# Patient Record
Sex: MALE | Race: WHITE | Employment: OTHER | URBAN - METROPOLITAN AREA
[De-identification: names, ages, dates, MRNs, and addresses within clinical notes are randomized per-mention and may not be internally consistent; named-entity substitution may affect disease eponyms.]

---

## 2018-01-22 ENCOUNTER — HOSPITAL ENCOUNTER (INPATIENT)
Age: 83
LOS: 14 days | Discharge: ANOTHER HOSPITAL | DRG: 193 | End: 2018-02-05
Attending: EMERGENCY MEDICINE | Admitting: FAMILY MEDICINE
Payer: MEDICARE

## 2018-01-22 ENCOUNTER — APPOINTMENT (OUTPATIENT)
Dept: GENERAL RADIOLOGY | Age: 83
DRG: 193 | End: 2018-01-22
Payer: MEDICARE

## 2018-01-22 DIAGNOSIS — J96.01 ACUTE HYPOXEMIC RESPIRATORY FAILURE (HCC): ICD-10-CM

## 2018-01-22 DIAGNOSIS — J11.1 INFLUENZA: Primary | ICD-10-CM

## 2018-01-22 DIAGNOSIS — E86.0 DEHYDRATION: ICD-10-CM

## 2018-01-22 DIAGNOSIS — J18.9 PNEUMONIA OF BOTH LUNGS DUE TO INFECTIOUS ORGANISM, UNSPECIFIED PART OF LUNG: ICD-10-CM

## 2018-01-22 PROBLEM — N18.9 ACUTE ON CHRONIC RENAL INSUFFICIENCY: Status: ACTIVE | Noted: 2018-01-22

## 2018-01-22 PROBLEM — N28.9 ACUTE ON CHRONIC RENAL INSUFFICIENCY: Status: ACTIVE | Noted: 2018-01-22

## 2018-01-22 PROBLEM — G20 PARKINSON DISEASE (HCC): Status: ACTIVE | Noted: 2018-01-22

## 2018-01-22 PROBLEM — J10.1 INFLUENZA A: Status: ACTIVE | Noted: 2018-01-22

## 2018-01-22 LAB
ABSOLUTE EOS #: 0 K/UL (ref 0–0.4)
ABSOLUTE IMMATURE GRANULOCYTE: ABNORMAL K/UL (ref 0–0.3)
ABSOLUTE LYMPH #: 0.58 K/UL (ref 1–4.8)
ABSOLUTE MONO #: 0.29 K/UL (ref 0.2–0.8)
ALBUMIN SERPL-MCNC: 2.4 G/DL (ref 3.5–5.2)
ALBUMIN/GLOBULIN RATIO: ABNORMAL (ref 1–2.5)
ALP BLD-CCNC: 73 U/L (ref 40–129)
ALT SERPL-CCNC: 6 U/L (ref 5–41)
ANION GAP SERPL CALCULATED.3IONS-SCNC: 14 MMOL/L (ref 9–17)
AST SERPL-CCNC: 68 U/L
BASOPHILS # BLD: 1 %
BASOPHILS ABSOLUTE: 0.05 K/UL (ref 0–0.2)
BILIRUB SERPL-MCNC: 0.19 MG/DL (ref 0.3–1.2)
BILIRUBIN DIRECT: <0.08 MG/DL
BILIRUBIN, INDIRECT: ABNORMAL MG/DL (ref 0–1)
BNP INTERPRETATION: ABNORMAL
BUN BLDV-MCNC: 61 MG/DL (ref 8–23)
BUN/CREAT BLD: 26 (ref 9–20)
CALCIUM SERPL-MCNC: 8.6 MG/DL (ref 8.6–10.4)
CHLORIDE BLD-SCNC: 103 MMOL/L (ref 98–107)
CO2: 20 MMOL/L (ref 20–31)
CREAT SERPL-MCNC: 2.33 MG/DL (ref 0.7–1.2)
DIFFERENTIAL TYPE: ABNORMAL
DIRECT EXAM: ABNORMAL
EKG ATRIAL RATE: 67 BPM
EKG P AXIS: 63 DEGREES
EKG P-R INTERVAL: 170 MS
EKG Q-T INTERVAL: 396 MS
EKG QRS DURATION: 82 MS
EKG QTC CALCULATION (BAZETT): 418 MS
EKG R AXIS: -2 DEGREES
EKG T AXIS: 39 DEGREES
EKG VENTRICULAR RATE: 67 BPM
EOSINOPHILS RELATIVE PERCENT: 0 % (ref 1–4)
GFR AFRICAN AMERICAN: 33 ML/MIN
GFR NON-AFRICAN AMERICAN: 27 ML/MIN
GFR SERPL CREATININE-BSD FRML MDRD: ABNORMAL ML/MIN/{1.73_M2}
GFR SERPL CREATININE-BSD FRML MDRD: ABNORMAL ML/MIN/{1.73_M2}
GLOBULIN: ABNORMAL G/DL (ref 1.5–3.8)
GLUCOSE BLD-MCNC: 95 MG/DL (ref 70–99)
HCT VFR BLD CALC: 40.7 % (ref 41–53)
HEMOGLOBIN: 13 G/DL (ref 13.5–17.5)
IMMATURE GRANULOCYTES: ABNORMAL %
LACTIC ACID: 2.1 MMOL/L (ref 0.5–2.2)
LACTIC ACID: 3.1 MMOL/L (ref 0.5–2.2)
LYMPHOCYTES # BLD: 12 % (ref 24–44)
Lab: ABNORMAL
MCH RBC QN AUTO: 29.9 PG (ref 26–34)
MCHC RBC AUTO-ENTMCNC: 32 G/DL (ref 31–37)
MCV RBC AUTO: 93.7 FL (ref 80–100)
MONOCYTES # BLD: 6 % (ref 1–7)
MORPHOLOGY: ABNORMAL
MYOGLOBIN: 321 NG/ML (ref 28–72)
NRBC AUTOMATED: ABNORMAL PER 100 WBC
PDW BLD-RTO: 14.6 % (ref 11.5–14.5)
PLATELET # BLD: 152 K/UL (ref 130–400)
PLATELET ESTIMATE: ABNORMAL
PMV BLD AUTO: 9.7 FL (ref 6–12)
POTASSIUM SERPL-SCNC: 4.3 MMOL/L (ref 3.7–5.3)
PRO-BNP: 1787 PG/ML
RBC # BLD: 4.34 M/UL (ref 4.5–5.9)
RBC # BLD: ABNORMAL 10*6/UL
SEG NEUTROPHILS: 81 % (ref 36–66)
SEGMENTED NEUTROPHILS ABSOLUTE COUNT: 3.88 K/UL (ref 1.8–7.7)
SODIUM BLD-SCNC: 137 MMOL/L (ref 135–144)
SPECIMEN DESCRIPTION: ABNORMAL
STATUS: ABNORMAL
TOTAL PROTEIN: 6.1 G/DL (ref 6.4–8.3)
TROPONIN INTERP: ABNORMAL
TROPONIN T: <0.03 NG/ML
WBC # BLD: 4.8 K/UL (ref 3.5–11)
WBC # BLD: ABNORMAL 10*3/UL

## 2018-01-22 PROCEDURE — 6370000000 HC RX 637 (ALT 250 FOR IP): Performed by: FAMILY MEDICINE

## 2018-01-22 PROCEDURE — 36415 COLL VENOUS BLD VENIPUNCTURE: CPT

## 2018-01-22 PROCEDURE — 2580000003 HC RX 258: Performed by: NURSE PRACTITIONER

## 2018-01-22 PROCEDURE — 80076 HEPATIC FUNCTION PANEL: CPT

## 2018-01-22 PROCEDURE — 87040 BLOOD CULTURE FOR BACTERIA: CPT

## 2018-01-22 PROCEDURE — 94640 AIRWAY INHALATION TREATMENT: CPT

## 2018-01-22 PROCEDURE — 71045 X-RAY EXAM CHEST 1 VIEW: CPT

## 2018-01-22 PROCEDURE — 6360000002 HC RX W HCPCS: Performed by: NURSE PRACTITIONER

## 2018-01-22 PROCEDURE — 2700000000 HC OXYGEN THERAPY PER DAY

## 2018-01-22 PROCEDURE — 1200000000 HC SEMI PRIVATE

## 2018-01-22 PROCEDURE — 96375 TX/PRO/DX INJ NEW DRUG ADDON: CPT

## 2018-01-22 PROCEDURE — 85025 COMPLETE CBC W/AUTO DIFF WBC: CPT

## 2018-01-22 PROCEDURE — 84484 ASSAY OF TROPONIN QUANT: CPT

## 2018-01-22 PROCEDURE — 94761 N-INVAS EAR/PLS OXIMETRY MLT: CPT

## 2018-01-22 PROCEDURE — 2500000003 HC RX 250 WO HCPCS: Performed by: NURSE PRACTITIONER

## 2018-01-22 PROCEDURE — 87804 INFLUENZA ASSAY W/OPTIC: CPT

## 2018-01-22 PROCEDURE — 83874 ASSAY OF MYOGLOBIN: CPT

## 2018-01-22 PROCEDURE — 83880 ASSAY OF NATRIURETIC PEPTIDE: CPT

## 2018-01-22 PROCEDURE — 93005 ELECTROCARDIOGRAM TRACING: CPT

## 2018-01-22 PROCEDURE — 83605 ASSAY OF LACTIC ACID: CPT

## 2018-01-22 PROCEDURE — 99285 EMERGENCY DEPT VISIT HI MDM: CPT

## 2018-01-22 PROCEDURE — 80048 BASIC METABOLIC PNL TOTAL CA: CPT

## 2018-01-22 PROCEDURE — 96365 THER/PROPH/DIAG IV INF INIT: CPT

## 2018-01-22 RX ORDER — DONEPEZIL HYDROCHLORIDE 10 MG/1
10 TABLET, FILM COATED ORAL NIGHTLY
COMMUNITY

## 2018-01-22 RX ORDER — LEVOFLOXACIN 500 MG/1
500 TABLET, FILM COATED ORAL DAILY
Status: ON HOLD | COMMUNITY
End: 2018-01-23 | Stop reason: ALTCHOICE

## 2018-01-22 RX ORDER — DONEPEZIL HYDROCHLORIDE 10 MG/1
10 TABLET, FILM COATED ORAL NIGHTLY
Status: DISCONTINUED | OUTPATIENT
Start: 2018-01-22 | End: 2018-02-02

## 2018-01-22 RX ORDER — ASPIRIN 81 MG/1
81 TABLET ORAL DAILY
Status: ON HOLD | COMMUNITY
End: 2018-02-05 | Stop reason: HOSPADM

## 2018-01-22 RX ORDER — LEVALBUTEROL 1.25 MG/.5ML
1.25 SOLUTION, CONCENTRATE RESPIRATORY (INHALATION) ONCE
Status: COMPLETED | OUTPATIENT
Start: 2018-01-22 | End: 2018-01-22

## 2018-01-22 RX ORDER — POLYETHYLENE GLYCOL 3350 17 G/17G
17 POWDER, FOR SOLUTION ORAL DAILY
Status: ON HOLD | COMMUNITY
End: 2018-02-05

## 2018-01-22 RX ORDER — LEVALBUTEROL 1.25 MG/.5ML
1.25 SOLUTION, CONCENTRATE RESPIRATORY (INHALATION)
Status: DISCONTINUED | OUTPATIENT
Start: 2018-01-23 | End: 2018-01-23

## 2018-01-22 RX ORDER — ASPIRIN 81 MG/1
81 TABLET ORAL DAILY
Status: DISCONTINUED | OUTPATIENT
Start: 2018-01-23 | End: 2018-01-24

## 2018-01-22 RX ORDER — SODIUM CHLORIDE 9 MG/ML
INJECTION, SOLUTION INTRAVENOUS CONTINUOUS
Status: DISCONTINUED | OUTPATIENT
Start: 2018-01-22 | End: 2018-01-23

## 2018-01-22 RX ORDER — CARBIDOPA AND LEVODOPA 50; 200 MG/1; MG/1
1 TABLET, EXTENDED RELEASE ORAL 2 TIMES DAILY
Status: ON HOLD | COMMUNITY
End: 2018-01-23 | Stop reason: DRUGHIGH

## 2018-01-22 RX ORDER — OSELTAMIVIR PHOSPHATE 30 MG/1
30 CAPSULE ORAL DAILY
Status: DISCONTINUED | OUTPATIENT
Start: 2018-01-22 | End: 2018-01-26

## 2018-01-22 RX ORDER — ATORVASTATIN CALCIUM 40 MG/1
40 TABLET, FILM COATED ORAL DAILY
COMMUNITY

## 2018-01-22 RX ORDER — LISINOPRIL 10 MG/1
10 TABLET ORAL DAILY
Status: ON HOLD | COMMUNITY
End: 2018-02-05 | Stop reason: HOSPADM

## 2018-01-22 RX ORDER — TAMSULOSIN HYDROCHLORIDE 0.4 MG/1
0.4 CAPSULE ORAL DAILY
Status: DISCONTINUED | OUTPATIENT
Start: 2018-01-23 | End: 2018-01-26

## 2018-01-22 RX ORDER — SODIUM CHLORIDE 0.9 % (FLUSH) 0.9 %
10 SYRINGE (ML) INJECTION PRN
Status: DISCONTINUED | OUTPATIENT
Start: 2018-01-22 | End: 2018-02-05 | Stop reason: HOSPADM

## 2018-01-22 RX ORDER — LEVALBUTEROL 1.25 MG/.5ML
1.25 SOLUTION, CONCENTRATE RESPIRATORY (INHALATION)
Status: DISCONTINUED | OUTPATIENT
Start: 2018-01-22 | End: 2018-01-22

## 2018-01-22 RX ORDER — ACETAMINOPHEN 160 MG/5ML
650 SOLUTION ORAL EVERY 4 HOURS PRN
Status: DISCONTINUED | OUTPATIENT
Start: 2018-01-22 | End: 2018-02-02

## 2018-01-22 RX ORDER — SODIUM CHLORIDE 0.9 % (FLUSH) 0.9 %
10 SYRINGE (ML) INJECTION EVERY 12 HOURS SCHEDULED
Status: DISCONTINUED | OUTPATIENT
Start: 2018-01-22 | End: 2018-02-05 | Stop reason: HOSPADM

## 2018-01-22 RX ORDER — SODIUM CHLORIDE FOR INHALATION 0.9 %
3 VIAL, NEBULIZER (ML) INHALATION EVERY 8 HOURS PRN
Status: DISCONTINUED | OUTPATIENT
Start: 2018-01-22 | End: 2018-01-22

## 2018-01-22 RX ORDER — TAMSULOSIN HYDROCHLORIDE 0.4 MG/1
0.4 CAPSULE ORAL DAILY
Status: ON HOLD | COMMUNITY
End: 2018-02-05 | Stop reason: HOSPADM

## 2018-01-22 RX ORDER — LEVOFLOXACIN 5 MG/ML
500 INJECTION, SOLUTION INTRAVENOUS
Status: ON HOLD | COMMUNITY
End: 2018-01-23 | Stop reason: ALTCHOICE

## 2018-01-22 RX ADMIN — LEVALBUTEROL HYDROCHLORIDE 1.25 MG: 1.25 SOLUTION, CONCENTRATE RESPIRATORY (INHALATION) at 17:36

## 2018-01-22 RX ADMIN — SODIUM CHLORIDE 1000 ML: 9 INJECTION, SOLUTION INTRAVENOUS at 18:27

## 2018-01-22 RX ADMIN — DONEPEZIL HYDROCHLORIDE 10 MG: 10 TABLET, FILM COATED ORAL at 22:26

## 2018-01-22 RX ADMIN — CEFTRIAXONE SODIUM 1 G: 10 INJECTION, POWDER, FOR SOLUTION INTRAVENOUS at 17:15

## 2018-01-22 RX ADMIN — AZITHROMYCIN MONOHYDRATE 500 MG: 500 INJECTION, POWDER, LYOPHILIZED, FOR SOLUTION INTRAVENOUS at 17:20

## 2018-01-22 RX ADMIN — ACETAMINOPHEN 650 MG: 325 SOLUTION ORAL at 21:49

## 2018-01-22 RX ADMIN — OSELTAMIVIR PHOSPHATE 30 MG: 30 CAPSULE ORAL at 21:31

## 2018-01-22 RX ADMIN — CARBIDOPA AND LEVODOPA 1 TABLET: 25; 100 TABLET ORAL at 22:26

## 2018-01-22 ASSESSMENT — ENCOUNTER SYMPTOMS
SINUS PRESSURE: 0
VOMITING: 0
COUGH: 1
RHINORRHEA: 0
ABDOMINAL PAIN: 0
NAUSEA: 0
DIARRHEA: 0
SORE THROAT: 0
SHORTNESS OF BREATH: 0
COLOR CHANGE: 0

## 2018-01-22 ASSESSMENT — PAIN SCALES - GENERAL: PAINLEVEL_OUTOF10: 0

## 2018-01-22 NOTE — ED PROVIDER NOTES
[facility] ED  eMERGENCY dEPARTMENT eNCOUnter      Pt Name: An Tipton  MRN: [id number]  [place] 1935  Date of evaluation: 1/22/2018  Provider: Sanju Bhakta NP    [address] [place] [address]       Chief Complaint   Patient presents with    Fatigue    Altered Mental Status         HISTORY OF PRESENT ILLNESS  (Location/Symptom, Timing/Onset, Context/Setting, Quality, Duration, Modifying Factors, Severity.)   An Tipton is a 80 y.o. male who presents to the emergency department via EMS for fatigue, cough, generalized weakness, decreased appetite, confusion, fever. Onset was 3-4 days ago. Denies vomiting, diarrhea. Denies abdominal pain, chest pain. Denies pain. He has been taking motrin and tylenol for his fever. He was started on Levaquin. Since yesterday he has been too weak to ambulate. His fluid intake has decreased today. He has a history of aspiration pneumonia; family has been thickening his liquids. Nursing Notes were reviewed. ALLERGIES     Review of patient's allergies indicates no known allergies.     CURRENT MEDICATIONS       Previous Medications    ASPIRIN 81 MG EC TABLET    Take 81 mg by mouth daily    ATORVASTATIN (LIPITOR) 40 MG TABLET    Take 40 mg by mouth daily    CARBIDOPA-LEVODOPA (SINEMET CR)  MG PER EXTENDED RELEASE TABLET    Take 1 tablet by mouth 2 times daily    CARBIDOPA-LEVODOPA (SINEMET)  MG PER TABLET    Take 1 tablet by mouth 3 times daily    DONEPEZIL (ARICEPT) 10 MG TABLET    Take 10 mg by mouth nightly    LEVOFLOXACIN (LEVAQUIN) 500 MG TABLET    Take 500 mg by mouth daily    LEVOFLOXACIN (LEVAQUIN) 500 MG/100ML SOLN    Infuse 500 mg intravenously every 24 hours    LISINOPRIL (PRINIVIL;ZESTRIL) 10 MG TABLET    Take 10 mg by mouth daily    POLYETHYLENE GLYCOL (MIRALAX) PACK PACKET    Take 17 g by mouth daily    TAMSULOSIN (FLOMAX) 0.4 MG CAPSULE    Take 0.4 mg by mouth daily       PAST MEDICAL HISTORY         Diagnosis Date    Arthritis    

## 2018-01-23 ENCOUNTER — APPOINTMENT (OUTPATIENT)
Dept: GENERAL RADIOLOGY | Age: 83
DRG: 193 | End: 2018-01-23
Payer: MEDICARE

## 2018-01-23 LAB
ABSOLUTE EOS #: 0 K/UL (ref 0–0.4)
ABSOLUTE IMMATURE GRANULOCYTE: ABNORMAL K/UL (ref 0–0.3)
ABSOLUTE LYMPH #: 0.4 K/UL (ref 1–4.8)
ABSOLUTE MONO #: 0.1 K/UL (ref 0.2–0.8)
ANION GAP SERPL CALCULATED.3IONS-SCNC: 11 MMOL/L (ref 9–17)
ANION GAP SERPL CALCULATED.3IONS-SCNC: 16 MMOL/L (ref 9–17)
ANION GAP SERPL CALCULATED.3IONS-SCNC: 21 MMOL/L (ref 9–17)
BASOPHILS # BLD: 0 % (ref 0–2)
BASOPHILS ABSOLUTE: 0 K/UL (ref 0–0.2)
BNP INTERPRETATION: ABNORMAL
BUN BLDV-MCNC: 57 MG/DL (ref 8–23)
BUN BLDV-MCNC: 62 MG/DL (ref 8–23)
BUN BLDV-MCNC: 79 MG/DL (ref 8–23)
BUN/CREAT BLD: 26 (ref 9–20)
BUN/CREAT BLD: 29 (ref 9–20)
BUN/CREAT BLD: 38 (ref 9–20)
CALCIUM SERPL-MCNC: 7.9 MG/DL (ref 8.6–10.4)
CALCIUM SERPL-MCNC: 8.5 MG/DL (ref 8.6–10.4)
CALCIUM SERPL-MCNC: 8.5 MG/DL (ref 8.6–10.4)
CHLORIDE BLD-SCNC: 104 MMOL/L (ref 98–107)
CHLORIDE BLD-SCNC: 106 MMOL/L (ref 98–107)
CHLORIDE BLD-SCNC: 110 MMOL/L (ref 98–107)
CO2: 17 MMOL/L (ref 20–31)
CO2: 19 MMOL/L (ref 20–31)
CO2: 22 MMOL/L (ref 20–31)
CREAT SERPL-MCNC: 2.07 MG/DL (ref 0.7–1.2)
CREAT SERPL-MCNC: 2.12 MG/DL (ref 0.7–1.2)
CREAT SERPL-MCNC: 2.19 MG/DL (ref 0.7–1.2)
DATE, STOOL #1: ABNORMAL
DATE, STOOL #2: ABNORMAL
DATE, STOOL #3: ABNORMAL
DIFFERENTIAL TYPE: ABNORMAL
EOSINOPHILS RELATIVE PERCENT: 0 % (ref 1–4)
FIO2: 100
FIO2: 30
FIO2: 70
GFR AFRICAN AMERICAN: 35 ML/MIN
GFR AFRICAN AMERICAN: 36 ML/MIN
GFR AFRICAN AMERICAN: 37 ML/MIN
GFR NON-AFRICAN AMERICAN: 29 ML/MIN
GFR NON-AFRICAN AMERICAN: 30 ML/MIN
GFR NON-AFRICAN AMERICAN: 31 ML/MIN
GFR SERPL CREATININE-BSD FRML MDRD: ABNORMAL ML/MIN/{1.73_M2}
GLUCOSE BLD-MCNC: 130 MG/DL (ref 75–110)
GLUCOSE BLD-MCNC: 133 MG/DL (ref 70–99)
GLUCOSE BLD-MCNC: 146 MG/DL (ref 70–99)
GLUCOSE BLD-MCNC: 180 MG/DL (ref 70–99)
HCO3 VENOUS: 22.7 MMOL/L (ref 24–30)
HCT VFR BLD CALC: 32.8 % (ref 41–53)
HCT VFR BLD CALC: 40.5 % (ref 41–53)
HEMOCCULT SP1 STL QL: POSITIVE
HEMOCCULT SP2 STL QL: ABNORMAL
HEMOCCULT SP3 STL QL: ABNORMAL
HEMOGLOBIN: 10.7 G/DL (ref 13.5–17.5)
HEMOGLOBIN: 13.2 G/DL (ref 13.5–17.5)
IMMATURE GRANULOCYTES: ABNORMAL %
LV EF: 55 %
LVEF MODALITY: NORMAL
LYMPHOCYTES # BLD: 9 % (ref 24–44)
MAGNESIUM, IONIZED: 0.65 MMOL/L (ref 0.45–0.6)
MCH RBC QN AUTO: 30.3 PG (ref 26–34)
MCH RBC QN AUTO: 30.4 PG (ref 26–34)
MCHC RBC AUTO-ENTMCNC: 32.5 G/DL (ref 31–37)
MCHC RBC AUTO-ENTMCNC: 32.6 G/DL (ref 31–37)
MCV RBC AUTO: 93 FL (ref 80–100)
MCV RBC AUTO: 93 FL (ref 80–100)
MONOCYTES # BLD: 4 % (ref 1–7)
NEGATIVE BASE EXCESS, ART: 1 (ref 0–2)
NEGATIVE BASE EXCESS, ART: 8 (ref 0–2)
NEGATIVE BASE EXCESS, VEN: 4 (ref 0–2)
NRBC AUTOMATED: ABNORMAL PER 100 WBC
NRBC AUTOMATED: ABNORMAL PER 100 WBC
O2 DEVICE/FLOW/%: ABNORMAL
O2 SAT, VEN: ABNORMAL %
PATIENT TEMP: 101.5
PATIENT TEMP: ABNORMAL
PATIENT TEMP: ABNORMAL
PCO2, VEN: 47 MM HG (ref 39–55)
PDW BLD-RTO: 14.2 % (ref 11.5–14.5)
PDW BLD-RTO: 14.5 % (ref 11.5–14.5)
PH VENOUS: 7.29 (ref 7.32–7.42)
PLATELET # BLD: 142 K/UL (ref 130–400)
PLATELET # BLD: 151 K/UL (ref 130–400)
PLATELET ESTIMATE: ABNORMAL
PMV BLD AUTO: 10 FL (ref 6–12)
PMV BLD AUTO: 10.6 FL (ref 6–12)
PO2, VEN: ABNORMAL MM HG (ref 30–50)
POC HCO3: 17 MMOL/L (ref 22–27)
POC HCO3: 22.4 MMOL/L (ref 22–27)
POC O2 SATURATION: 100 %
POC O2 SATURATION: 99 %
POC PCO2 TEMP: 51 MM HG
POC PCO2 TEMP: ABNORMAL MM HG
POC PCO2 TEMP: ABNORMAL MM HG
POC PCO2: 30 MM HG (ref 32–45)
POC PCO2: 31 MM HG (ref 32–45)
POC PH TEMP: 7.27
POC PH TEMP: ABNORMAL
POC PH TEMP: ABNORMAL
POC PH: 7.36 (ref 7.35–7.45)
POC PH: 7.47 (ref 7.35–7.45)
POC PO2 TEMP: ABNORMAL MM HG
POC PO2: 107 MM HG (ref 75–95)
POC PO2: 185 MM HG (ref 75–95)
POSITIVE BASE EXCESS, ART: ABNORMAL (ref 0–2)
POSITIVE BASE EXCESS, ART: ABNORMAL (ref 0–2)
POSITIVE BASE EXCESS, VEN: ABNORMAL (ref 0–2)
POTASSIUM SERPL-SCNC: 4.1 MMOL/L (ref 3.7–5.3)
POTASSIUM SERPL-SCNC: 4.5 MMOL/L (ref 3.7–5.3)
POTASSIUM SERPL-SCNC: 4.9 MMOL/L (ref 3.7–5.3)
PRO-BNP: 2747 PG/ML
RBC # BLD: 3.52 M/UL (ref 4.5–5.9)
RBC # BLD: 4.35 M/UL (ref 4.5–5.9)
RBC # BLD: ABNORMAL 10*6/UL
SEG NEUTROPHILS: 87 % (ref 36–66)
SEGMENTED NEUTROPHILS ABSOLUTE COUNT: 3.3 K/UL (ref 1.8–7.7)
SODIUM BLD-SCNC: 140 MMOL/L (ref 135–144)
SODIUM BLD-SCNC: 142 MMOL/L (ref 135–144)
SODIUM BLD-SCNC: 144 MMOL/L (ref 135–144)
TCO2 (CALC), ART: 18 MMOL/L (ref 23–28)
TCO2 (CALC), ART: 23 MMOL/L (ref 23–28)
TIME, STOOL #1: 1930
TIME, STOOL #2: ABNORMAL
TIME, STOOL #3: ABNORMAL
TOTAL CO2, VENOUS: 24 MMOL/L (ref 25–31)
TROPONIN INTERP: ABNORMAL
TROPONIN T: 0.06 NG/ML
WBC # BLD: 3.8 K/UL (ref 3.5–11)
WBC # BLD: 4 K/UL (ref 3.5–11)
WBC # BLD: ABNORMAL 10*3/UL

## 2018-01-23 PROCEDURE — 36600 WITHDRAWAL OF ARTERIAL BLOOD: CPT

## 2018-01-23 PROCEDURE — 71045 X-RAY EXAM CHEST 1 VIEW: CPT

## 2018-01-23 PROCEDURE — 83880 ASSAY OF NATRIURETIC PEPTIDE: CPT

## 2018-01-23 PROCEDURE — 87641 MR-STAPH DNA AMP PROBE: CPT

## 2018-01-23 PROCEDURE — 6360000002 HC RX W HCPCS: Performed by: INTERNAL MEDICINE

## 2018-01-23 PROCEDURE — G0328 FECAL BLOOD SCRN IMMUNOASSAY: HCPCS

## 2018-01-23 PROCEDURE — 93306 TTE W/DOPPLER COMPLETE: CPT

## 2018-01-23 PROCEDURE — 36415 COLL VENOUS BLD VENIPUNCTURE: CPT

## 2018-01-23 PROCEDURE — 85025 COMPLETE CBC W/AUTO DIFF WBC: CPT

## 2018-01-23 PROCEDURE — S0028 INJECTION, FAMOTIDINE, 20 MG: HCPCS | Performed by: INTERNAL MEDICINE

## 2018-01-23 PROCEDURE — 80048 BASIC METABOLIC PNL TOTAL CA: CPT

## 2018-01-23 PROCEDURE — 94640 AIRWAY INHALATION TREATMENT: CPT

## 2018-01-23 PROCEDURE — 85027 COMPLETE CBC AUTOMATED: CPT

## 2018-01-23 PROCEDURE — 2580000003 HC RX 258: Performed by: INTERNAL MEDICINE

## 2018-01-23 PROCEDURE — 6360000002 HC RX W HCPCS: Performed by: FAMILY MEDICINE

## 2018-01-23 PROCEDURE — 82947 ASSAY GLUCOSE BLOOD QUANT: CPT

## 2018-01-23 PROCEDURE — C9113 INJ PANTOPRAZOLE SODIUM, VIA: HCPCS | Performed by: FAMILY MEDICINE

## 2018-01-23 PROCEDURE — 36556 INSERT NON-TUNNEL CV CATH: CPT

## 2018-01-23 PROCEDURE — 2700000000 HC OXYGEN THERAPY PER DAY

## 2018-01-23 PROCEDURE — 2000000000 HC ICU R&B

## 2018-01-23 PROCEDURE — 94660 CPAP INITIATION&MGMT: CPT

## 2018-01-23 PROCEDURE — 2500000003 HC RX 250 WO HCPCS: Performed by: INTERNAL MEDICINE

## 2018-01-23 PROCEDURE — 83735 ASSAY OF MAGNESIUM: CPT

## 2018-01-23 PROCEDURE — 31720 CLEARANCE OF AIRWAYS: CPT

## 2018-01-23 PROCEDURE — 2580000003 HC RX 258: Performed by: NURSE PRACTITIONER

## 2018-01-23 PROCEDURE — 05HM33Z INSERTION OF INFUSION DEVICE INTO RIGHT INTERNAL JUGULAR VEIN, PERCUTANEOUS APPROACH: ICD-10-PCS | Performed by: FAMILY MEDICINE

## 2018-01-23 PROCEDURE — 94761 N-INVAS EAR/PLS OXIMETRY MLT: CPT

## 2018-01-23 PROCEDURE — 2580000003 HC RX 258: Performed by: FAMILY MEDICINE

## 2018-01-23 PROCEDURE — 84484 ASSAY OF TROPONIN QUANT: CPT

## 2018-01-23 PROCEDURE — 82803 BLOOD GASES ANY COMBINATION: CPT

## 2018-01-23 PROCEDURE — 6370000000 HC RX 637 (ALT 250 FOR IP): Performed by: FAMILY MEDICINE

## 2018-01-23 RX ORDER — DEXTROSE AND SODIUM CHLORIDE 5; .45 G/100ML; G/100ML
INJECTION, SOLUTION INTRAVENOUS CONTINUOUS
Status: DISCONTINUED | OUTPATIENT
Start: 2018-01-23 | End: 2018-01-26

## 2018-01-23 RX ORDER — 0.9 % SODIUM CHLORIDE 0.9 %
250 INTRAVENOUS SOLUTION INTRAVENOUS ONCE
Status: COMPLETED | OUTPATIENT
Start: 2018-01-23 | End: 2018-01-23

## 2018-01-23 RX ORDER — DEXMEDETOMIDINE HYDROCHLORIDE 4 UG/ML
0.2 INJECTION, SOLUTION INTRAVENOUS CONTINUOUS
Status: DISCONTINUED | OUTPATIENT
Start: 2018-01-23 | End: 2018-01-25

## 2018-01-23 RX ORDER — CARBIDOPA AND LEVODOPA 25; 100 MG/1; MG/1
1 TABLET, EXTENDED RELEASE ORAL 2 TIMES DAILY
Status: ON HOLD | COMMUNITY
End: 2018-02-05 | Stop reason: HOSPADM

## 2018-01-23 RX ORDER — LEVALBUTEROL 1.25 MG/.5ML
1.25 SOLUTION, CONCENTRATE RESPIRATORY (INHALATION) EVERY 4 HOURS
Status: DISCONTINUED | OUTPATIENT
Start: 2018-01-23 | End: 2018-01-29

## 2018-01-23 RX ORDER — PANTOPRAZOLE SODIUM 40 MG/10ML
20 INJECTION, POWDER, LYOPHILIZED, FOR SOLUTION INTRAVENOUS NIGHTLY
Status: DISCONTINUED | OUTPATIENT
Start: 2018-01-23 | End: 2018-01-27

## 2018-01-23 RX ORDER — ACETAMINOPHEN 650 MG/1
650 SUPPOSITORY RECTAL EVERY 4 HOURS PRN
Status: DISCONTINUED | OUTPATIENT
Start: 2018-01-23 | End: 2018-02-05 | Stop reason: HOSPADM

## 2018-01-23 RX ORDER — 0.9 % SODIUM CHLORIDE 0.9 %
10 VIAL (ML) INJECTION DAILY
Status: DISCONTINUED | OUTPATIENT
Start: 2018-01-24 | End: 2018-01-27

## 2018-01-23 RX ORDER — LEVALBUTEROL 1.25 MG/.5ML
1.25 SOLUTION, CONCENTRATE RESPIRATORY (INHALATION)
Status: DISCONTINUED | OUTPATIENT
Start: 2018-01-23 | End: 2018-01-23

## 2018-01-23 RX ORDER — AMLODIPINE BESYLATE 2.5 MG/1
2.5 TABLET ORAL NIGHTLY
Status: ON HOLD | COMMUNITY
End: 2018-02-05 | Stop reason: HOSPADM

## 2018-01-23 RX ORDER — BUDESONIDE 0.5 MG/2ML
0.5 INHALANT ORAL 2 TIMES DAILY
Status: DISCONTINUED | OUTPATIENT
Start: 2018-01-23 | End: 2018-01-26

## 2018-01-23 RX ORDER — ACETYLCYSTEINE 200 MG/ML
600 SOLUTION ORAL; RESPIRATORY (INHALATION) 3 TIMES DAILY
Status: DISCONTINUED | OUTPATIENT
Start: 2018-01-23 | End: 2018-02-05 | Stop reason: HOSPADM

## 2018-01-23 RX ORDER — FUROSEMIDE 10 MG/ML
10 INJECTION INTRAMUSCULAR; INTRAVENOUS ONCE
Status: COMPLETED | OUTPATIENT
Start: 2018-01-23 | End: 2018-01-23

## 2018-01-23 RX ORDER — METHYLPREDNISOLONE SODIUM SUCCINATE 125 MG/2ML
125 INJECTION, POWDER, LYOPHILIZED, FOR SOLUTION INTRAMUSCULAR; INTRAVENOUS ONCE
Status: COMPLETED | OUTPATIENT
Start: 2018-01-23 | End: 2018-01-23

## 2018-01-23 RX ORDER — METHYLPREDNISOLONE SODIUM SUCCINATE 40 MG/ML
40 INJECTION, POWDER, LYOPHILIZED, FOR SOLUTION INTRAMUSCULAR; INTRAVENOUS EVERY 8 HOURS
Status: DISCONTINUED | OUTPATIENT
Start: 2018-01-23 | End: 2018-01-25

## 2018-01-23 RX ORDER — HEPARIN SODIUM 5000 [USP'U]/ML
5000 INJECTION, SOLUTION INTRAVENOUS; SUBCUTANEOUS 2 TIMES DAILY
Status: DISCONTINUED | OUTPATIENT
Start: 2018-01-23 | End: 2018-01-24

## 2018-01-23 RX ADMIN — PHENYLEPHRINE HYDROCHLORIDE 50 MCG/MIN: 10 INJECTION INTRAVENOUS at 19:42

## 2018-01-23 RX ADMIN — BUDESONIDE 500 MCG: 0.5 SUSPENSION RESPIRATORY (INHALATION) at 19:49

## 2018-01-23 RX ADMIN — SODIUM CHLORIDE 250 ML: 0.9 INJECTION, SOLUTION INTRAVENOUS at 18:25

## 2018-01-23 RX ADMIN — IPRATROPIUM BROMIDE 0.5 MG: 0.5 SOLUTION RESPIRATORY (INHALATION) at 11:26

## 2018-01-23 RX ADMIN — HEPARIN SODIUM 5000 UNITS: 5000 INJECTION, SOLUTION INTRAVENOUS; SUBCUTANEOUS at 14:01

## 2018-01-23 RX ADMIN — Medication 3.38 G: at 10:45

## 2018-01-23 RX ADMIN — LEVALBUTEROL HYDROCHLORIDE 1.25 MG: 1.25 SOLUTION, CONCENTRATE RESPIRATORY (INHALATION) at 14:48

## 2018-01-23 RX ADMIN — DEXMEDETOMIDINE HYDROCHLORIDE 0.2 MCG/KG/HR: 4 INJECTION, SOLUTION INTRAVENOUS at 09:27

## 2018-01-23 RX ADMIN — LEVALBUTEROL 1.25 MG: 1.25 SOLUTION, CONCENTRATE RESPIRATORY (INHALATION) at 02:10

## 2018-01-23 RX ADMIN — AZITHROMYCIN MONOHYDRATE 500 MG: 500 INJECTION, POWDER, LYOPHILIZED, FOR SOLUTION INTRAVENOUS at 19:48

## 2018-01-23 RX ADMIN — DEXTROSE AND SODIUM CHLORIDE: 5; 450 INJECTION, SOLUTION INTRAVENOUS at 13:44

## 2018-01-23 RX ADMIN — ACETAMINOPHEN 650 MG: 650 SUPPOSITORY RECTAL at 03:17

## 2018-01-23 RX ADMIN — IPRATROPIUM BROMIDE 0.5 MG: 0.5 SOLUTION RESPIRATORY (INHALATION) at 14:47

## 2018-01-23 RX ADMIN — Medication 3.38 G: at 21:07

## 2018-01-23 RX ADMIN — ACETYLCYSTEINE 600 MG: 200 SOLUTION ORAL; RESPIRATORY (INHALATION) at 14:47

## 2018-01-23 RX ADMIN — IPRATROPIUM BROMIDE 0.5 MG: 0.5 SOLUTION RESPIRATORY (INHALATION) at 19:49

## 2018-01-23 RX ADMIN — SODIUM CHLORIDE 250 ML: 9 INJECTION, SOLUTION INTRAVENOUS at 17:25

## 2018-01-23 RX ADMIN — LEVALBUTEROL 1.25 MG: 1.25 SOLUTION, CONCENTRATE RESPIRATORY (INHALATION) at 07:10

## 2018-01-23 RX ADMIN — FAMOTIDINE 20 MG: 10 INJECTION, SOLUTION INTRAVENOUS at 14:05

## 2018-01-23 RX ADMIN — METHYLPREDNISOLONE SODIUM SUCCINATE 40 MG: 40 INJECTION, POWDER, FOR SOLUTION INTRAMUSCULAR; INTRAVENOUS at 17:00

## 2018-01-23 RX ADMIN — LEVALBUTEROL 1.25 MG: 1.25 SOLUTION, CONCENTRATE RESPIRATORY (INHALATION) at 11:25

## 2018-01-23 RX ADMIN — Medication 10 ML: at 22:38

## 2018-01-23 RX ADMIN — METHYLPREDNISOLONE SODIUM SUCCINATE 125 MG: 125 INJECTION, POWDER, FOR SOLUTION INTRAMUSCULAR; INTRAVENOUS at 07:58

## 2018-01-23 RX ADMIN — LEVALBUTEROL HYDROCHLORIDE 1.25 MG: 1.25 SOLUTION, CONCENTRATE RESPIRATORY (INHALATION) at 19:49

## 2018-01-23 RX ADMIN — METHYLPREDNISOLONE SODIUM SUCCINATE 40 MG: 40 INJECTION, POWDER, FOR SOLUTION INTRAMUSCULAR; INTRAVENOUS at 22:37

## 2018-01-23 RX ADMIN — DEXTROSE AND SODIUM CHLORIDE: 5; 450 INJECTION, SOLUTION INTRAVENOUS at 19:42

## 2018-01-23 RX ADMIN — SODIUM CHLORIDE: 9 INJECTION, SOLUTION INTRAVENOUS at 05:06

## 2018-01-23 RX ADMIN — ACETYLCYSTEINE 600 MG: 200 SOLUTION ORAL; RESPIRATORY (INHALATION) at 19:49

## 2018-01-23 RX ADMIN — FUROSEMIDE 10 MG: 10 INJECTION, SOLUTION INTRAMUSCULAR; INTRAVENOUS at 03:05

## 2018-01-23 RX ADMIN — PANTOPRAZOLE SODIUM 20 MG: 40 INJECTION, POWDER, FOR SOLUTION INTRAVENOUS at 22:37

## 2018-01-23 ASSESSMENT — PAIN SCALES - GENERAL
PAINLEVEL_OUTOF10: 0
PAINLEVEL_OUTOF10: 0

## 2018-01-23 NOTE — PLAN OF CARE
Spoke with Ana Edouard RN and Dg Hale RN  Pt has consult for potential Midline placement. Pt currently has PIV access. Plan for Midline tomorrow if still needed. Dg Nip notified that if patient condition changes requiring additional access emergently due to patient condition becoming critical, the On Call process to call in IR team for Central Line placement is to be utilized. No additional questions voiced.

## 2018-01-23 NOTE — PROGRESS NOTES
Nutrition Assessment    Type and Reason for Visit: Initial, Consult    Nutrition Recommendations: 1. NPO diet status. 2. When pt is appropriate, will place on an oral diet, suggesting renal diet (Dysphagia I?)  3. Recommend swallow evaluation if pt has difficulty swallowing for appropriate diet texture/consistency. Malnutrition Assessment:  · Malnutrition Status: At risk for malnutrition  · Context: Acute illness or injury  · Findings of the 6 clinical characteristics of malnutrition (Minimum of 2 out of 6 clinical characteristics is required to make the diagnosis of moderate or severe Protein Calorie Malnutrition based on AND/ASPEN Guidelines):  1. Energy Intake-Less than or equal to 50%, not able to assess    2. Weight Loss-No significant weight loss,    3. Fat Loss-Unable to assess,    4. Muscle Loss-Unable to assess,    5. Fluid Accumulation-No significant fluid accumulation, Extremities  6.  Strength-Not measured    Nutrition Diagnosis:   · Problem: Predicted suboptimal energy intake  · Etiology: related to Cognitive or neurological impairment, Insufficient energy/nutrient consumption     Signs and symptoms:  as evidenced by  (swallow difficulty, weakness, SOB)    Nutrition Assessment:  · Subjective Assessment:  Pt is admitted with altered mental status, and severe respiratory distress. Note pt is currently NPO.  Note diet previously ordered: Dysphagia I.   · Nutrition-Focused Physical Findings: GI: +flat, +hypoactive bowel sounds, +PV: WDL   · Wound Type: None  · Current Nutrition Therapies:  · Oral Diet Orders: NPO   · Oral Diet intake: NPO  · Oral Nutrition Supplement (ONS) Orders: None  · ONS intake: NPO  · Additional Calories: D5% IVF: 265 kcal  · Anthropometric Measures:  · Ht: 5' 2\" (157.5 cm)   · Admission Body Wt: 125 lb (56.7 kg)  · Ideal Body Wt: 118 lb (53.5 kg), % Ideal Body 106%  · BMI Classification: BMI 18.5 - 24.9 Normal Weight  · Comparative Standards (Estimated Nutrition

## 2018-01-23 NOTE — H&P
clear  Ears - hearing appears to be intact  Nose - no drainage noted  Mouth - mucous membranes moist  Neck - supple, no carotid bruits, thyroid not palpable  Chest - diminished at the base rhonchi , rales right base  Heart - normal rate, regular rhythm, no murmurs  Abdomen - soft, nontender, nondistended, bowel sounds present all four quadrants, no masses, hepatomegaly or splenomegaly  Neurological - normal speech, no focal findings or movement disorder noted, cranial nerves II through XII grossly intact  Extremities - peripheral pulses palpable, no pedal edema or calf pain with palpation  Skin - BCC left nasal brideg, rashes, or induration noted    Osteopathic Examination: negative    Data:     [unfilled]    Assesment:     Primary Problem  <principal problem not specified>    Active Hospital Problems    Diagnosis Date Noted    Influenza A [J10.1] 01/22/2018     Priority: High    Acute on chronic renal insufficiency [N28.9, N18.9] 01/22/2018     Priority: High    Pneumonia [J18.9] 01/22/2018    Parkinson disease ([facility] [address] Lukas Batista 01/22/2018       Plan:     1. See orders    2. Iv fluids  3. Aerosols , iv antibiotic rocephin zithromax ,   4. tamiflu   5. dvt prophylaxis lovenox if renal function improves   6.  Pt ot speech and swallow       Electronically signed by Randa Ley on 1/22/2018 at 9:53 PM     Copy sent to Dr. Libby Calderón MD

## 2018-01-23 NOTE — FLOWSHEET NOTE
This note also relates to the following rows which could not be included:  Pulse - Cannot attach notes to unvalidated device data  Resp - Cannot attach notes to unvalidated device data       01/23/18 1240   Oxygen Therapy   SpO2 100 %   Pulse Oximeter Device Mode Continuous   Pulse Oximeter Device Location Finger   O2 Device Bi-PAP   FiO2  70 %   Dr. Apple Holman at bedside and requested FiO2 decreased to 55% and changed. Continue to monitor.

## 2018-01-24 ENCOUNTER — APPOINTMENT (OUTPATIENT)
Dept: GENERAL RADIOLOGY | Age: 83
DRG: 193 | End: 2018-01-24
Payer: MEDICARE

## 2018-01-24 ENCOUNTER — APPOINTMENT (OUTPATIENT)
Dept: ULTRASOUND IMAGING | Age: 83
DRG: 193 | End: 2018-01-24
Payer: MEDICARE

## 2018-01-24 LAB
-: ABNORMAL
ABSOLUTE EOS #: 0 K/UL (ref 0–0.4)
ABSOLUTE IMMATURE GRANULOCYTE: ABNORMAL K/UL (ref 0–0.3)
ABSOLUTE LYMPH #: 0.4 K/UL (ref 1–4.8)
ABSOLUTE MONO #: 0.3 K/UL (ref 0.2–0.8)
AMORPHOUS: ABNORMAL
ANION GAP SERPL CALCULATED.3IONS-SCNC: 11 MMOL/L (ref 9–17)
BACTERIA: ABNORMAL
BASOPHILS # BLD: 0 % (ref 0–2)
BASOPHILS ABSOLUTE: 0 K/UL (ref 0–0.2)
BILIRUBIN URINE: NEGATIVE
BUN BLDV-MCNC: 79 MG/DL (ref 8–23)
BUN/CREAT BLD: 44 (ref 9–20)
CALCIUM SERPL-MCNC: 8.2 MG/DL (ref 8.6–10.4)
CASTS UA: ABNORMAL /LPF
CHLORIDE BLD-SCNC: 115 MMOL/L (ref 98–107)
CHLORIDE, UR: <20 MMOL/L
CO2: 20 MMOL/L (ref 20–31)
COLOR: YELLOW
COMMENT UA: ABNORMAL
CREAT SERPL-MCNC: 1.8 MG/DL (ref 0.7–1.2)
CREATININE URINE: 78.7 MG/DL (ref 39–259)
CRYSTALS, UA: ABNORMAL /HPF
DIFFERENTIAL TYPE: ABNORMAL
EOSINOPHILS RELATIVE PERCENT: 0 % (ref 1–4)
EPITHELIAL CELLS UA: ABNORMAL /HPF (ref 0–5)
GFR AFRICAN AMERICAN: 44 ML/MIN
GFR NON-AFRICAN AMERICAN: 36 ML/MIN
GFR SERPL CREATININE-BSD FRML MDRD: ABNORMAL ML/MIN/{1.73_M2}
GFR SERPL CREATININE-BSD FRML MDRD: ABNORMAL ML/MIN/{1.73_M2}
GLUCOSE BLD-MCNC: 189 MG/DL (ref 75–110)
GLUCOSE BLD-MCNC: 199 MG/DL (ref 75–110)
GLUCOSE BLD-MCNC: 208 MG/DL (ref 70–99)
GLUCOSE URINE: NEGATIVE
HCT VFR BLD CALC: 32.8 % (ref 41–53)
HEMOGLOBIN: 10.7 G/DL (ref 13.5–17.5)
IMMATURE GRANULOCYTES: ABNORMAL %
KETONES, URINE: NEGATIVE
LEUKOCYTE ESTERASE, URINE: NEGATIVE
LYMPHOCYTES # BLD: 5 % (ref 24–44)
MCH RBC QN AUTO: 30.1 PG (ref 26–34)
MCHC RBC AUTO-ENTMCNC: 32.8 G/DL (ref 31–37)
MCV RBC AUTO: 91.8 FL (ref 80–100)
MONOCYTES # BLD: 4 % (ref 1–7)
MRSA, DNA, NASAL: NORMAL
MUCUS: ABNORMAL
NITRITE, URINE: NEGATIVE
NRBC AUTOMATED: ABNORMAL PER 100 WBC
OTHER OBSERVATIONS UA: ABNORMAL
PARTIAL THROMBOPLASTIN TIME: 36.4 SEC (ref 23–31)
PDW BLD-RTO: 13.9 % (ref 11.5–14.5)
PH UA: 5.5 (ref 5–8)
PLATELET # BLD: 172 K/UL (ref 130–400)
PLATELET ESTIMATE: ABNORMAL
PMV BLD AUTO: 9.4 FL (ref 6–12)
POTASSIUM SERPL-SCNC: 4.1 MMOL/L (ref 3.7–5.3)
PROTEIN UA: ABNORMAL
RBC # BLD: 3.57 M/UL (ref 4.5–5.9)
RBC # BLD: ABNORMAL 10*6/UL
RBC UA: ABNORMAL /HPF (ref 0–2)
RENAL EPITHELIAL, UA: ABNORMAL /HPF
SEG NEUTROPHILS: 91 % (ref 36–66)
SEGMENTED NEUTROPHILS ABSOLUTE COUNT: 7.6 K/UL (ref 1.8–7.7)
SODIUM BLD-SCNC: 146 MMOL/L (ref 135–144)
SODIUM,UR: 20 MMOL/L
SPECIFIC GRAVITY UA: 1.02 (ref 1–1.03)
SPECIMEN DESCRIPTION: NORMAL
TOTAL PROTEIN, URINE: 53 MG/DL
TRICHOMONAS: ABNORMAL
TURBIDITY: ABNORMAL
URINE HGB: ABNORMAL
URINE TOTAL PROTEIN CREATININE RATIO: 0.67 (ref 0–0.2)
UROBILINOGEN, URINE: NORMAL
WBC # BLD: 8.4 K/UL (ref 3.5–11)
WBC # BLD: ABNORMAL 10*3/UL
WBC UA: ABNORMAL /HPF (ref 0–5)
YEAST: ABNORMAL

## 2018-01-24 PROCEDURE — 82947 ASSAY GLUCOSE BLOOD QUANT: CPT

## 2018-01-24 PROCEDURE — 84166 PROTEIN E-PHORESIS/URINE/CSF: CPT

## 2018-01-24 PROCEDURE — 84165 PROTEIN E-PHORESIS SERUM: CPT

## 2018-01-24 PROCEDURE — 6360000002 HC RX W HCPCS: Performed by: FAMILY MEDICINE

## 2018-01-24 PROCEDURE — 85025 COMPLETE CBC W/AUTO DIFF WBC: CPT

## 2018-01-24 PROCEDURE — 81001 URINALYSIS AUTO W/SCOPE: CPT

## 2018-01-24 PROCEDURE — 6360000002 HC RX W HCPCS: Performed by: INTERNAL MEDICINE

## 2018-01-24 PROCEDURE — 82570 ASSAY OF URINE CREATININE: CPT

## 2018-01-24 PROCEDURE — 6370000000 HC RX 637 (ALT 250 FOR IP): Performed by: FAMILY MEDICINE

## 2018-01-24 PROCEDURE — S0028 INJECTION, FAMOTIDINE, 20 MG: HCPCS | Performed by: INTERNAL MEDICINE

## 2018-01-24 PROCEDURE — 86334 IMMUNOFIX E-PHORESIS SERUM: CPT

## 2018-01-24 PROCEDURE — C9113 INJ PANTOPRAZOLE SODIUM, VIA: HCPCS | Performed by: FAMILY MEDICINE

## 2018-01-24 PROCEDURE — 71045 X-RAY EXAM CHEST 1 VIEW: CPT

## 2018-01-24 PROCEDURE — 94660 CPAP INITIATION&MGMT: CPT

## 2018-01-24 PROCEDURE — 84300 ASSAY OF URINE SODIUM: CPT

## 2018-01-24 PROCEDURE — 85730 THROMBOPLASTIN TIME PARTIAL: CPT

## 2018-01-24 PROCEDURE — 2000000000 HC ICU R&B

## 2018-01-24 PROCEDURE — 2500000003 HC RX 250 WO HCPCS: Performed by: INTERNAL MEDICINE

## 2018-01-24 PROCEDURE — 94640 AIRWAY INHALATION TREATMENT: CPT

## 2018-01-24 PROCEDURE — 2700000000 HC OXYGEN THERAPY PER DAY

## 2018-01-24 PROCEDURE — 84156 ASSAY OF PROTEIN URINE: CPT

## 2018-01-24 PROCEDURE — 94761 N-INVAS EAR/PLS OXIMETRY MLT: CPT

## 2018-01-24 PROCEDURE — 80048 BASIC METABOLIC PNL TOTAL CA: CPT

## 2018-01-24 PROCEDURE — 2580000003 HC RX 258: Performed by: FAMILY MEDICINE

## 2018-01-24 PROCEDURE — 76770 US EXAM ABDO BACK WALL COMP: CPT

## 2018-01-24 PROCEDURE — 84155 ASSAY OF PROTEIN SERUM: CPT

## 2018-01-24 PROCEDURE — 82436 ASSAY OF URINE CHLORIDE: CPT

## 2018-01-24 RX ORDER — AMLODIPINE BESYLATE 2.5 MG/1
2.5 TABLET ORAL NIGHTLY
Status: DISCONTINUED | OUTPATIENT
Start: 2018-01-24 | End: 2018-01-25

## 2018-01-24 RX ORDER — DEXTROSE MONOHYDRATE 50 MG/ML
100 INJECTION, SOLUTION INTRAVENOUS PRN
Status: DISCONTINUED | OUTPATIENT
Start: 2018-01-24 | End: 2018-02-05 | Stop reason: HOSPADM

## 2018-01-24 RX ORDER — HYDRALAZINE HYDROCHLORIDE 20 MG/ML
10 INJECTION INTRAMUSCULAR; INTRAVENOUS EVERY 6 HOURS PRN
Status: DISCONTINUED | OUTPATIENT
Start: 2018-01-24 | End: 2018-01-30

## 2018-01-24 RX ORDER — NICOTINE POLACRILEX 4 MG
15 LOZENGE BUCCAL PRN
Status: DISCONTINUED | OUTPATIENT
Start: 2018-01-24 | End: 2018-02-05 | Stop reason: HOSPADM

## 2018-01-24 RX ORDER — ONDANSETRON 2 MG/ML
4 INJECTION INTRAMUSCULAR; INTRAVENOUS EVERY 6 HOURS PRN
Status: DISCONTINUED | OUTPATIENT
Start: 2018-01-24 | End: 2018-02-05 | Stop reason: HOSPADM

## 2018-01-24 RX ORDER — DEXTROSE MONOHYDRATE 25 G/50ML
12.5 INJECTION, SOLUTION INTRAVENOUS PRN
Status: DISCONTINUED | OUTPATIENT
Start: 2018-01-24 | End: 2018-02-05 | Stop reason: HOSPADM

## 2018-01-24 RX ADMIN — METHYLPREDNISOLONE SODIUM SUCCINATE 40 MG: 40 INJECTION, POWDER, FOR SOLUTION INTRAMUSCULAR; INTRAVENOUS at 21:23

## 2018-01-24 RX ADMIN — BUDESONIDE 500 MCG: 0.5 SUSPENSION RESPIRATORY (INHALATION) at 19:34

## 2018-01-24 RX ADMIN — OSELTAMIVIR PHOSPHATE 30 MG: 30 CAPSULE ORAL at 13:56

## 2018-01-24 RX ADMIN — PANTOPRAZOLE SODIUM 20 MG: 40 INJECTION, POWDER, FOR SOLUTION INTRAVENOUS at 21:23

## 2018-01-24 RX ADMIN — Medication 3.38 G: at 21:49

## 2018-01-24 RX ADMIN — AMLODIPINE BESYLATE 2.5 MG: 2.5 TABLET ORAL at 21:23

## 2018-01-24 RX ADMIN — FAMOTIDINE 20 MG: 10 INJECTION, SOLUTION INTRAVENOUS at 11:41

## 2018-01-24 RX ADMIN — INSULIN LISPRO 1 UNITS: 100 INJECTION, SOLUTION INTRAVENOUS; SUBCUTANEOUS at 18:25

## 2018-01-24 RX ADMIN — Medication 10 ML: at 21:23

## 2018-01-24 RX ADMIN — BUDESONIDE 500 MCG: 0.5 SUSPENSION RESPIRATORY (INHALATION) at 07:57

## 2018-01-24 RX ADMIN — INSULIN LISPRO 1 UNITS: 100 INJECTION, SOLUTION INTRAVENOUS; SUBCUTANEOUS at 12:37

## 2018-01-24 RX ADMIN — CARBIDOPA AND LEVODOPA 1 TABLET: 25; 100 TABLET ORAL at 21:23

## 2018-01-24 RX ADMIN — CARBIDOPA AND LEVODOPA 1 TABLET: 25; 100 TABLET ORAL at 13:55

## 2018-01-24 RX ADMIN — LEVALBUTEROL HYDROCHLORIDE 1.25 MG: 1.25 SOLUTION, CONCENTRATE RESPIRATORY (INHALATION) at 00:07

## 2018-01-24 RX ADMIN — LEVALBUTEROL HYDROCHLORIDE 1.25 MG: 1.25 SOLUTION, CONCENTRATE RESPIRATORY (INHALATION) at 07:57

## 2018-01-24 RX ADMIN — ACETYLCYSTEINE 600 MG: 200 SOLUTION ORAL; RESPIRATORY (INHALATION) at 19:34

## 2018-01-24 RX ADMIN — AZITHROMYCIN MONOHYDRATE 500 MG: 500 INJECTION, POWDER, LYOPHILIZED, FOR SOLUTION INTRAVENOUS at 16:39

## 2018-01-24 RX ADMIN — IPRATROPIUM BROMIDE 0.5 MG: 0.5 SOLUTION RESPIRATORY (INHALATION) at 15:53

## 2018-01-24 RX ADMIN — LEVALBUTEROL HYDROCHLORIDE 1.25 MG: 1.25 SOLUTION, CONCENTRATE RESPIRATORY (INHALATION) at 03:45

## 2018-01-24 RX ADMIN — Medication 3.38 G: at 14:05

## 2018-01-24 RX ADMIN — METHYLPREDNISOLONE SODIUM SUCCINATE 40 MG: 40 INJECTION, POWDER, FOR SOLUTION INTRAMUSCULAR; INTRAVENOUS at 07:20

## 2018-01-24 RX ADMIN — DONEPEZIL HYDROCHLORIDE 10 MG: 10 TABLET, FILM COATED ORAL at 21:23

## 2018-01-24 RX ADMIN — IPRATROPIUM BROMIDE 0.5 MG: 0.5 SOLUTION RESPIRATORY (INHALATION) at 19:34

## 2018-01-24 RX ADMIN — LEVALBUTEROL HYDROCHLORIDE 1.25 MG: 1.25 SOLUTION, CONCENTRATE RESPIRATORY (INHALATION) at 19:34

## 2018-01-24 RX ADMIN — IPRATROPIUM BROMIDE 0.5 MG: 0.5 SOLUTION RESPIRATORY (INHALATION) at 11:45

## 2018-01-24 RX ADMIN — METHYLPREDNISOLONE SODIUM SUCCINATE 40 MG: 40 INJECTION, POWDER, FOR SOLUTION INTRAMUSCULAR; INTRAVENOUS at 14:24

## 2018-01-24 RX ADMIN — ACETAMINOPHEN 650 MG: 325 SOLUTION ORAL at 16:27

## 2018-01-24 RX ADMIN — IPRATROPIUM BROMIDE 0.5 MG: 0.5 SOLUTION RESPIRATORY (INHALATION) at 07:57

## 2018-01-24 RX ADMIN — LEVALBUTEROL HYDROCHLORIDE 1.25 MG: 1.25 SOLUTION, CONCENTRATE RESPIRATORY (INHALATION) at 15:53

## 2018-01-24 RX ADMIN — Medication 3.38 G: at 05:53

## 2018-01-24 RX ADMIN — LEVALBUTEROL HYDROCHLORIDE 1.25 MG: 1.25 SOLUTION, CONCENTRATE RESPIRATORY (INHALATION) at 11:45

## 2018-01-24 RX ADMIN — ACETYLCYSTEINE 400 MG: 200 SOLUTION ORAL; RESPIRATORY (INHALATION) at 07:57

## 2018-01-24 ASSESSMENT — PAIN SCALES - GENERAL
PAINLEVEL_OUTOF10: 0

## 2018-01-24 NOTE — FLOWSHEET NOTE
01/23/18 1240   Provider Notification   Reason for Communication Review case;Evaluate;New orders  (Dr. Diana Silverio. Rvw IVF,vitals,labs,CXR,meds,IV access)   Provider Name Dr. Llamas Began   Provider Notification Physician   Method of Communication Face to face   Response See orders  (Solumedrol,Midline,pepcid,heparin,Sinement Valens@[employer])   Notification Time 1336   Dr. Llamas Began at bedside for evaluation and discussed care with Dr. Jovany Gonzalez and they reviewed IVF, vitals, labs, CXR, IV access, medications and being NPO. Orders received for solumedrol dose change, IR consult for midline IV, pepcid, heparin sq, pharmacy to dose Sinement patch, change IVF to D5.45 NS at 65 cc/hour, echocardiogram, and MRSA swab.

## 2018-01-24 NOTE — CONSULTS
Consult Note    Reason for Consult: LILLIAN    Requesting Physician:  Ernestine Munoz MD    HISTORY OF PRESENT ILLNESS:    The patient is a 80 y.o. male admitted to [facility] on the 22nd shortness of breath. He had been diagnosed with influenza A and had been initiated on Tamiflu. The patient's condition worsened to the point where he was brought to the emergency room. His presenting serum creatinine was 2.23. The patient has a label of chronic kidney disease on its prior hospital admissions. However note some creatinine of about 1 from 2015. The patient has not seen a nephrologist from our group. Admission chest x-ray demonstrated multifocal patchy infiltrates in the right upper lobe and left lung BX. He had slightly elevated serum troponin and was initiated on heparin and antiplatelet agents. The patient was transferred to the cardiac ICU. Over the next 24 hours he developed sustained hypertension with blood pressures in the 60s requiring initiation of appropriate IV catheter. The patient has a history of Parkinson's/dementia/aspiration couple good by prior CVA. He was on thickened dysphagia type diet. He was taking his pills. Due to concerns continued aspiration his meds were held including the Tamiflu. The patient is currently on broad-spectrum antibodies including Zosyn to cover bacterial pneumonia. The patient's serum creatinine remained elevated this morning when he actually dropped down to 1.8. He now has improved hemodynamic parameters and urine output. The heparin and aspirin were stopped due to the development of occult blood positive stools. Echocardiogram revealed preserved LV function. Review Of Systems:   Unobtainable at the current time apart from as noted above. Past Medical History:   1. Parkinson's disease  2. Hypertension  3. Hyperlipidemia  4. ?COPD with prior history of smoking  5.  coronary artery disease/CABG  6.  Left ischemic CVA in 2015 involving the

## 2018-01-24 NOTE — PROGRESS NOTES
Pulmonary Critical Care Progress Note  Chiqui Aparicio MD     Patient seen for the follow up of Acute respiratory failure, aspiration pneumonia, acute exacerbation of COPD, AK I, dysphagia, influenza A, Parkinson's dementia     Subjective:  He is mostly lethargic with periods of alertness. He denies chest pain. Mild occasional cough, mostly dry. Shortness of breath not much changed. Examination:  Vitals: BP (!) 163/71   Pulse 88   Temp 99.5 °F (37.5 °C) (Temporal)   Resp 28   Ht 5' 2\" (1.575 m)   Wt 125 lb (56.7 kg)   SpO2 100%   BMI 22.86 kg/m²   General appearance: Lethargic with periods of alertness and cooperative with exam  Neck: No JVD  Lungs: clear to auscultation bilaterally and diminished breath sounds bilaterally  Heart: regular rate and rhythm, S1, S2 normal, no gallop  Abdomen: Soft, non tender, + BS  Extremities: no cyanosis or clubbing. No significant edema    LABs:  CBC:   Recent Labs      01/23/18 1945 01/24/18   0519   WBC  3.8  8.4   HGB  10.7*  10.7*   HCT  32.8*  32.8*   PLT  142  172     BMP:   Recent Labs      01/23/18 1945 01/24/18   0519   NA  140  146*   K  4.1  4.1   CO2  19*  20   BUN  79*  79*   CREATININE  2.07*  1.80*   LABGLOM  31*  36*   GLUCOSE  180*  208*     APTT:  Recent Labs      01/24/18   0519   APTT  36.4*     LIVER PROFILE:  Recent Labs      01/22/18   1704   AST  68*   ALT  6   LABALBU  2.4*     ABG:  Lab Results   Component Value Date    OTA5BXB 18 01/23/2018    FIO2 30.0 01/23/2018       Lab Results   Component Value Date    POCPH 7.36 01/23/2018    POCPCO2 30 01/23/2018    POCPO2 185 01/23/2018    POCHCO3 17.0 01/23/2018    NBEA 8 01/23/2018    PBEA NOT REPORTED 01/23/2018    YTR2ZOX 18 01/23/2018    KLAS0GJH 100 01/23/2018    FIO2 30.0 01/23/2018     Radiology:      Impression:  · Acute hypoxic respiratory failure  · Acute exacerbation of COPD  · Aspiration Pneumonia  · Influenza A  · Dysphagia  · LILLIAN/CKD  · Parkinson's dementia  · Hypotension,?

## 2018-01-24 NOTE — FLOWSHEET NOTE
01/24/18 0747   Provider Notification   Reason for Communication Review case;Evaluate;New orders   Provider Name Dr. Shawna Juarez   Provider Notification Physician   Method of Communication Face to face   Response At bedside     Dr. Niki Elias at bedside. Discussed plan for today. NO NG/OG at this time as she feels patient is at risk for bleeding. ASA/Heparin discontinued. Cont IV Solumedrol and IV ATB. Small bites of pudding to check swallow but NO ORAL LIQUIDS. Will confirm with pulmonology before completing. Hyperglycemia noted due to steroids, orders received for insulin and accu checks every 6 hrs. Aware occult stools were positive and stools are frothy and black. Dr. Niki Elias felt sending a CDIFF was premature at this time. MD placed further orders in computer per self at this time.

## 2018-01-25 ENCOUNTER — APPOINTMENT (OUTPATIENT)
Dept: GENERAL RADIOLOGY | Age: 83
DRG: 193 | End: 2018-01-25
Payer: MEDICARE

## 2018-01-25 LAB
ABSOLUTE EOS #: 0 K/UL (ref 0–0.4)
ABSOLUTE IMMATURE GRANULOCYTE: ABNORMAL K/UL (ref 0–0.3)
ABSOLUTE LYMPH #: 0.4 K/UL (ref 1–4.8)
ABSOLUTE MONO #: 0 K/UL (ref 0.2–0.8)
ANION GAP SERPL CALCULATED.3IONS-SCNC: 12 MMOL/L (ref 9–17)
BASOPHILS # BLD: 0 % (ref 0–2)
BASOPHILS ABSOLUTE: 0 K/UL (ref 0–0.2)
BUN BLDV-MCNC: 67 MG/DL (ref 8–23)
BUN/CREAT BLD: 42 (ref 9–20)
CALCIUM SERPL-MCNC: 8.3 MG/DL (ref 8.6–10.4)
CHLORIDE BLD-SCNC: 118 MMOL/L (ref 98–107)
CO2: 20 MMOL/L (ref 20–31)
CREAT SERPL-MCNC: 1.59 MG/DL (ref 0.7–1.2)
DIFFERENTIAL TYPE: ABNORMAL
EOSINOPHILS RELATIVE PERCENT: 0 % (ref 1–4)
GFR AFRICAN AMERICAN: 51 ML/MIN
GFR NON-AFRICAN AMERICAN: 42 ML/MIN
GFR SERPL CREATININE-BSD FRML MDRD: ABNORMAL ML/MIN/{1.73_M2}
GFR SERPL CREATININE-BSD FRML MDRD: ABNORMAL ML/MIN/{1.73_M2}
GLUCOSE BLD-MCNC: 168 MG/DL (ref 75–110)
GLUCOSE BLD-MCNC: 175 MG/DL (ref 75–110)
GLUCOSE BLD-MCNC: 184 MG/DL (ref 75–110)
GLUCOSE BLD-MCNC: 197 MG/DL (ref 70–99)
GLUCOSE BLD-MCNC: 222 MG/DL (ref 75–110)
HCT VFR BLD CALC: 33.8 % (ref 41–53)
HEMOGLOBIN: 11 G/DL (ref 13.5–17.5)
IMMATURE GRANULOCYTES: ABNORMAL %
LYMPHOCYTES # BLD: 6 % (ref 24–44)
MCH RBC QN AUTO: 30.2 PG (ref 26–34)
MCHC RBC AUTO-ENTMCNC: 32.5 G/DL (ref 31–37)
MCV RBC AUTO: 92.9 FL (ref 80–100)
MONOCYTES # BLD: 1 % (ref 1–7)
NRBC AUTOMATED: ABNORMAL PER 100 WBC
PDW BLD-RTO: 14.8 % (ref 11.5–14.5)
PLATELET # BLD: 149 K/UL (ref 130–400)
PLATELET ESTIMATE: ABNORMAL
PMV BLD AUTO: 9.5 FL (ref 6–12)
POTASSIUM SERPL-SCNC: 3.7 MMOL/L (ref 3.7–5.3)
RBC # BLD: 3.64 M/UL (ref 4.5–5.9)
RBC # BLD: ABNORMAL 10*6/UL
SEG NEUTROPHILS: 93 % (ref 36–66)
SEGMENTED NEUTROPHILS ABSOLUTE COUNT: 6.7 K/UL (ref 1.8–7.7)
SODIUM BLD-SCNC: 150 MMOL/L (ref 135–144)
WBC # BLD: 7.2 K/UL (ref 3.5–11)
WBC # BLD: ABNORMAL 10*3/UL

## 2018-01-25 PROCEDURE — 2000000000 HC ICU R&B

## 2018-01-25 PROCEDURE — 72110 X-RAY EXAM L-2 SPINE 4/>VWS: CPT

## 2018-01-25 PROCEDURE — S0028 INJECTION, FAMOTIDINE, 20 MG: HCPCS | Performed by: INTERNAL MEDICINE

## 2018-01-25 PROCEDURE — 92610 EVALUATE SWALLOWING FUNCTION: CPT

## 2018-01-25 PROCEDURE — 94761 N-INVAS EAR/PLS OXIMETRY MLT: CPT

## 2018-01-25 PROCEDURE — 6360000002 HC RX W HCPCS: Performed by: INTERNAL MEDICINE

## 2018-01-25 PROCEDURE — G8997 SWALLOW GOAL STATUS: HCPCS

## 2018-01-25 PROCEDURE — G8996 SWALLOW CURRENT STATUS: HCPCS

## 2018-01-25 PROCEDURE — 6360000002 HC RX W HCPCS: Performed by: FAMILY MEDICINE

## 2018-01-25 PROCEDURE — 2500000003 HC RX 250 WO HCPCS: Performed by: INTERNAL MEDICINE

## 2018-01-25 PROCEDURE — C9113 INJ PANTOPRAZOLE SODIUM, VIA: HCPCS | Performed by: FAMILY MEDICINE

## 2018-01-25 PROCEDURE — 72072 X-RAY EXAM THORAC SPINE 3VWS: CPT

## 2018-01-25 PROCEDURE — 2580000003 HC RX 258: Performed by: FAMILY MEDICINE

## 2018-01-25 PROCEDURE — 82947 ASSAY GLUCOSE BLOOD QUANT: CPT

## 2018-01-25 PROCEDURE — 6370000000 HC RX 637 (ALT 250 FOR IP): Performed by: FAMILY MEDICINE

## 2018-01-25 PROCEDURE — 71045 X-RAY EXAM CHEST 1 VIEW: CPT

## 2018-01-25 PROCEDURE — 94660 CPAP INITIATION&MGMT: CPT

## 2018-01-25 PROCEDURE — 80048 BASIC METABOLIC PNL TOTAL CA: CPT

## 2018-01-25 PROCEDURE — 94640 AIRWAY INHALATION TREATMENT: CPT

## 2018-01-25 PROCEDURE — 94667 MNPJ CHEST WALL 1ST: CPT

## 2018-01-25 PROCEDURE — 85025 COMPLETE CBC W/AUTO DIFF WBC: CPT

## 2018-01-25 RX ORDER — METHYLPREDNISOLONE SODIUM SUCCINATE 40 MG/ML
30 INJECTION, POWDER, LYOPHILIZED, FOR SOLUTION INTRAMUSCULAR; INTRAVENOUS EVERY 8 HOURS
Status: DISCONTINUED | OUTPATIENT
Start: 2018-01-25 | End: 2018-01-26

## 2018-01-25 RX ORDER — AMLODIPINE BESYLATE 10 MG/1
10 TABLET ORAL NIGHTLY
Status: DISCONTINUED | OUTPATIENT
Start: 2018-01-25 | End: 2018-01-26

## 2018-01-25 RX ADMIN — IPRATROPIUM BROMIDE 0.5 MG: 0.5 SOLUTION RESPIRATORY (INHALATION) at 07:55

## 2018-01-25 RX ADMIN — LEVALBUTEROL HYDROCHLORIDE 1.25 MG: 1.25 SOLUTION, CONCENTRATE RESPIRATORY (INHALATION) at 07:55

## 2018-01-25 RX ADMIN — METHYLPREDNISOLONE SODIUM SUCCINATE 40 MG: 40 INJECTION, POWDER, FOR SOLUTION INTRAMUSCULAR; INTRAVENOUS at 06:00

## 2018-01-25 RX ADMIN — INSULIN LISPRO 1 UNITS: 100 INJECTION, SOLUTION INTRAVENOUS; SUBCUTANEOUS at 06:01

## 2018-01-25 RX ADMIN — ACETYLCYSTEINE 400 MG: 200 SOLUTION ORAL; RESPIRATORY (INHALATION) at 18:11

## 2018-01-25 RX ADMIN — LEVALBUTEROL HYDROCHLORIDE 1.25 MG: 1.25 SOLUTION, CONCENTRATE RESPIRATORY (INHALATION) at 15:25

## 2018-01-25 RX ADMIN — LEVALBUTEROL HYDROCHLORIDE 1.25 MG: 1.25 SOLUTION, CONCENTRATE RESPIRATORY (INHALATION) at 18:10

## 2018-01-25 RX ADMIN — AZITHROMYCIN MONOHYDRATE 500 MG: 500 INJECTION, POWDER, LYOPHILIZED, FOR SOLUTION INTRAVENOUS at 18:06

## 2018-01-25 RX ADMIN — ACETYLCYSTEINE 600 MG: 200 SOLUTION ORAL; RESPIRATORY (INHALATION) at 07:55

## 2018-01-25 RX ADMIN — LEVALBUTEROL HYDROCHLORIDE 1.25 MG: 1.25 SOLUTION, CONCENTRATE RESPIRATORY (INHALATION) at 23:37

## 2018-01-25 RX ADMIN — FAMOTIDINE 20 MG: 10 INJECTION, SOLUTION INTRAVENOUS at 09:33

## 2018-01-25 RX ADMIN — IPRATROPIUM BROMIDE 0.5 MG: 0.5 SOLUTION RESPIRATORY (INHALATION) at 11:00

## 2018-01-25 RX ADMIN — TAMSULOSIN HYDROCHLORIDE 0.4 MG: 0.4 CAPSULE ORAL at 09:34

## 2018-01-25 RX ADMIN — Medication 3.38 G: at 04:39

## 2018-01-25 RX ADMIN — LEVALBUTEROL HYDROCHLORIDE 1.25 MG: 1.25 SOLUTION, CONCENTRATE RESPIRATORY (INHALATION) at 00:05

## 2018-01-25 RX ADMIN — OSELTAMIVIR PHOSPHATE 30 MG: 30 CAPSULE ORAL at 09:30

## 2018-01-25 RX ADMIN — Medication 10 ML: at 09:37

## 2018-01-25 RX ADMIN — PANTOPRAZOLE SODIUM 20 MG: 40 INJECTION, POWDER, FOR SOLUTION INTRAVENOUS at 20:50

## 2018-01-25 RX ADMIN — CARBIDOPA AND LEVODOPA 1 TABLET: 25; 100 TABLET ORAL at 20:44

## 2018-01-25 RX ADMIN — HYDRALAZINE HYDROCHLORIDE 10 MG: 20 INJECTION INTRAMUSCULAR; INTRAVENOUS at 16:05

## 2018-01-25 RX ADMIN — CARBIDOPA AND LEVODOPA 1 TABLET: 25; 100 TABLET ORAL at 14:58

## 2018-01-25 RX ADMIN — METHYLPREDNISOLONE SODIUM SUCCINATE 30 MG: 40 INJECTION, POWDER, FOR SOLUTION INTRAMUSCULAR; INTRAVENOUS at 20:45

## 2018-01-25 RX ADMIN — Medication 3.38 G: at 13:15

## 2018-01-25 RX ADMIN — BUDESONIDE 500 MCG: 0.5 SUSPENSION RESPIRATORY (INHALATION) at 18:11

## 2018-01-25 RX ADMIN — ACETYLCYSTEINE 600 MG: 200 SOLUTION ORAL; RESPIRATORY (INHALATION) at 15:25

## 2018-01-25 RX ADMIN — Medication 10 ML: at 20:51

## 2018-01-25 RX ADMIN — LEVALBUTEROL HYDROCHLORIDE 1.25 MG: 1.25 SOLUTION, CONCENTRATE RESPIRATORY (INHALATION) at 10:59

## 2018-01-25 RX ADMIN — Medication 10 ML: at 09:30

## 2018-01-25 RX ADMIN — LEVALBUTEROL HYDROCHLORIDE 1.25 MG: 1.25 SOLUTION, CONCENTRATE RESPIRATORY (INHALATION) at 03:40

## 2018-01-25 RX ADMIN — INSULIN LISPRO 2 UNITS: 100 INJECTION, SOLUTION INTRAVENOUS; SUBCUTANEOUS at 12:15

## 2018-01-25 RX ADMIN — BUDESONIDE 500 MCG: 0.5 SUSPENSION RESPIRATORY (INHALATION) at 07:55

## 2018-01-25 RX ADMIN — Medication 3.38 G: at 20:45

## 2018-01-25 RX ADMIN — INSULIN LISPRO 1 UNITS: 100 INJECTION, SOLUTION INTRAVENOUS; SUBCUTANEOUS at 18:16

## 2018-01-25 RX ADMIN — INSULIN LISPRO 1 UNITS: 100 INJECTION, SOLUTION INTRAVENOUS; SUBCUTANEOUS at 01:19

## 2018-01-25 RX ADMIN — METHYLPREDNISOLONE SODIUM SUCCINATE 30 MG: 40 INJECTION, POWDER, FOR SOLUTION INTRAMUSCULAR; INTRAVENOUS at 14:58

## 2018-01-25 RX ADMIN — CARBIDOPA AND LEVODOPA 1 TABLET: 25; 100 TABLET ORAL at 09:34

## 2018-01-25 RX ADMIN — IPRATROPIUM BROMIDE 0.5 MG: 0.5 SOLUTION RESPIRATORY (INHALATION) at 15:25

## 2018-01-25 RX ADMIN — DONEPEZIL HYDROCHLORIDE 10 MG: 10 TABLET, FILM COATED ORAL at 20:45

## 2018-01-25 RX ADMIN — HYDRALAZINE HYDROCHLORIDE 10 MG: 20 INJECTION INTRAMUSCULAR; INTRAVENOUS at 04:33

## 2018-01-25 RX ADMIN — IPRATROPIUM BROMIDE 0.5 MG: 0.5 SOLUTION RESPIRATORY (INHALATION) at 18:10

## 2018-01-25 NOTE — PROGRESS NOTES
Pneumonia  · Influenza A  · Dysphagia  · LILLIAN/CKD  · Parkinson's dementia  · Hypotension,? Secondary to Precedex    Recommendations:  · Watch blood pressure off of Dilan-Synephrine  · Continue IV antibiotics, Zithromax/Zosyn  · IV solu-medrol, decrease dose  · Xopenex and Ipratropium Q 4 hours and prn  · Patient's calorie intake is not enough, not sure patient can be encouraged to eat enough.  ? Tube feeds  · X-ray chest in am  · Continue IV fluids  · Watch sodium and renal function, Labs: CBC and BMP in am  · BiPAP/high flow oxygen by nasal cannula  · 2 liters/min via nasal cannula  · DVT prophylaxis with low molecular weight heparin  · Will follow with you    Law Crowley MD, [facility]  Pulmonary Critical Care and Sleep Medicine,  [facility]  Cell: [phone]  Office: [phone]

## 2018-01-25 NOTE — FLOWSHEET NOTE
01/24/18 2306   Provider Notification   Reason for Communication Evaluate  (low out put 30ml/2h, BP [id number])   Provider Name Dr. Vikash Hernandez  (at bed side on call for [facility] )   Provider Notification Physician   Method of Communication Face to face   Response See orders   Notification Time 2300     Patient having ongoing low urine output and  Increased blood pressure. Dr. Vikash Hernandez at bed side. Orders received to increase  total  IV fluid to 100 ml/hr . PRN hydralazine 10 mg order SBP >180.

## 2018-01-25 NOTE — PROGRESS NOTES
Nephrology Progress Note        Subjective: Does not communicate, on Bipap, no respiratory distress, BP uncontrolled, good UOP, no pain    Objective:  CURRENT TEMPERATURE:  Temp: 98.1 °F (36.7 °C)  MAXIMUM TEMPERATURE OVER 24HRS:  Temp (24hrs), Av.1 °F (37.3 °C), Min:98.1 °F (36.7 °C), Max:100.8 °F (38.2 °C)    CURRENT RESPIRATORY RATE:  Resp: 18  CURRENT PULSE:  Pulse: 67  CURRENT BLOOD PRESSURE:  BP: (!) 146/62  24HR BLOOD PRESSURE RANGE:  Systolic (90FSY), EGB:781 , Min:90 , BDD:833   ; Diastolic (84FLU), FJS:09, Min:48, Max:103    24HR INTAKE/OUTPUT:    Intake/Output Summary (Last 24 hours) at 18 0716  Last data filed at 18 0551   Gross per 24 hour   Intake           1718.5 ml   Output             1440 ml   Net            278.5 ml     Weight   Wt Readings from Last 3 Encounters:   18 125 lb (56.7 kg)       Physical Exam:  in no acute distress, alert and with flat affect  Skin: warm and dry, no rash or erythema  Pulmonary: clear to auscultation bilaterally- no wheezes, rales or rhonchi, normal air movement, no respiratory distress  Cardiovascular: normal rate, normal S1 and S2, no gallops, intact distal pulses and no carotid bruits  Abdomen: soft nontender, bowel sounds present, no organomegaly,  no ascites  Extremities: no cyanosis, clubbing or edema    Current Medications:      glucose (GLUTOSE) 40 % oral gel 15 g PRN   dextrose 50 % solution 12.5 g PRN   glucagon (rDNA) injection 1 mg PRN   dextrose 5 % solution PRN   insulin lispro (HUMALOG) injection vial 0-6 Units Q6H   ondansetron (ZOFRAN) injection 4 mg Q6H PRN   amLODIPine (NORVASC) tablet 2.5 mg Nightly   hydrALAZINE (APRESOLINE) injection 10 mg Q6H PRN   acetaminophen (TYLENOL) suppository 650 mg Q4H PRN   dexmedetomidine (PRECEDEX) 400 mcg in sodium chloride 0.9 % 100 mL infusion Continuous   piperacillin-tazobactam (ZOSYN) 3.375 g in dextrose 50 mL IVPB extended infusion (premix) Q8H   ipratropium (ATROVENT) 0.02 % nebulizer

## 2018-01-26 ENCOUNTER — APPOINTMENT (OUTPATIENT)
Dept: GENERAL RADIOLOGY | Age: 83
DRG: 193 | End: 2018-01-26
Payer: MEDICARE

## 2018-01-26 LAB
ABSOLUTE EOS #: 0 K/UL (ref 0–0.4)
ABSOLUTE IMMATURE GRANULOCYTE: ABNORMAL K/UL (ref 0–0.3)
ABSOLUTE LYMPH #: 0.4 K/UL (ref 1–4.8)
ABSOLUTE MONO #: 0.4 K/UL (ref 0.2–0.8)
ALBUMIN (CALCULATED): 2.4 G/DL (ref 3.2–5.2)
ALBUMIN PERCENT: 49 % (ref 45–65)
ALPHA 1 PERCENT: 6 % (ref 3–6)
ALPHA 2 PERCENT: 16 % (ref 6–13)
ALPHA-1-GLOBULIN: 0.3 G/DL (ref 0.1–0.4)
ALPHA-2-GLOBULIN: 0.8 G/DL (ref 0.5–0.9)
ANION GAP SERPL CALCULATED.3IONS-SCNC: 10 MMOL/L (ref 9–17)
ANION GAP SERPL CALCULATED.3IONS-SCNC: 12 MMOL/L (ref 9–17)
BASOPHILS # BLD: 0 % (ref 0–2)
BASOPHILS ABSOLUTE: 0 K/UL (ref 0–0.2)
BETA GLOBULIN: 0.6 G/DL (ref 0.5–1.1)
BETA PERCENT: 13 % (ref 11–19)
BUN BLDV-MCNC: 49 MG/DL (ref 8–23)
BUN BLDV-MCNC: 56 MG/DL (ref 8–23)
BUN/CREAT BLD: 39 (ref 9–20)
BUN/CREAT BLD: 40 (ref 9–20)
CALCIUM SERPL-MCNC: 7.4 MG/DL (ref 8.6–10.4)
CALCIUM SERPL-MCNC: 8.3 MG/DL (ref 8.6–10.4)
CHLORIDE BLD-SCNC: 119 MMOL/L (ref 98–107)
CHLORIDE BLD-SCNC: 123 MMOL/L (ref 98–107)
CO2: 19 MMOL/L (ref 20–31)
CO2: 21 MMOL/L (ref 20–31)
CREAT SERPL-MCNC: 1.27 MG/DL (ref 0.7–1.2)
CREAT SERPL-MCNC: 1.4 MG/DL (ref 0.7–1.2)
DIFFERENTIAL TYPE: ABNORMAL
EOSINOPHILS RELATIVE PERCENT: 0 % (ref 1–4)
GAMMA GLOBULIN %: 16 % (ref 9–20)
GAMMA GLOBULIN: 0.8 G/DL (ref 0.5–1.5)
GFR AFRICAN AMERICAN: 59 ML/MIN
GFR AFRICAN AMERICAN: >60 ML/MIN
GFR NON-AFRICAN AMERICAN: 48 ML/MIN
GFR NON-AFRICAN AMERICAN: 54 ML/MIN
GFR SERPL CREATININE-BSD FRML MDRD: ABNORMAL ML/MIN/{1.73_M2}
GLUCOSE BLD-MCNC: 151 MG/DL (ref 75–110)
GLUCOSE BLD-MCNC: 160 MG/DL (ref 75–110)
GLUCOSE BLD-MCNC: 202 MG/DL (ref 75–110)
GLUCOSE BLD-MCNC: 207 MG/DL (ref 75–110)
GLUCOSE BLD-MCNC: 217 MG/DL (ref 70–99)
GLUCOSE BLD-MCNC: 219 MG/DL (ref 70–99)
GLUCOSE BLD-MCNC: 239 MG/DL (ref 75–110)
HCT VFR BLD CALC: 33 % (ref 41–53)
HEMOGLOBIN: 10.9 G/DL (ref 13.5–17.5)
IMMATURE GRANULOCYTES: ABNORMAL %
LYMPHOCYTES # BLD: 5 % (ref 24–44)
MCH RBC QN AUTO: 30.3 PG (ref 26–34)
MCHC RBC AUTO-ENTMCNC: 33.2 G/DL (ref 31–37)
MCV RBC AUTO: 91.2 FL (ref 80–100)
MONOCYTES # BLD: 4 % (ref 1–7)
NRBC AUTOMATED: ABNORMAL PER 100 WBC
P E INTERPRETATION, U: NORMAL
PATHOLOGIST: ABNORMAL
PATHOLOGIST: NORMAL
PATHOLOGIST: NORMAL
PDW BLD-RTO: 14.5 % (ref 11.5–14.5)
PLATELET # BLD: 188 K/UL (ref 130–400)
PLATELET ESTIMATE: ABNORMAL
PMV BLD AUTO: 8.7 FL (ref 6–12)
POTASSIUM SERPL-SCNC: 3.3 MMOL/L (ref 3.7–5.3)
POTASSIUM SERPL-SCNC: 4 MMOL/L (ref 3.7–5.3)
PROTEIN ELECTROPHORESIS, SERUM: ABNORMAL
RBC # BLD: 3.61 M/UL (ref 4.5–5.9)
RBC # BLD: ABNORMAL 10*6/UL
SEG NEUTROPHILS: 91 % (ref 36–66)
SEGMENTED NEUTROPHILS ABSOLUTE COUNT: 8 K/UL (ref 1.8–7.7)
SERUM IFX INTERP: NORMAL
SODIUM BLD-SCNC: 152 MMOL/L (ref 135–144)
SODIUM BLD-SCNC: 152 MMOL/L (ref 135–144)
SPECIMEN TYPE: NORMAL
TOTAL PROT. SUM,%: 100 % (ref 98–102)
TOTAL PROT. SUM: 4.9 G/DL (ref 6.3–8.2)
TOTAL PROTEIN: 4.8 G/DL (ref 6.4–8.3)
URINE TOTAL PROTEIN: 52 MG/DL
WBC # BLD: 8.8 K/UL (ref 3.5–11)
WBC # BLD: ABNORMAL 10*3/UL

## 2018-01-26 PROCEDURE — C9113 INJ PANTOPRAZOLE SODIUM, VIA: HCPCS | Performed by: FAMILY MEDICINE

## 2018-01-26 PROCEDURE — 94761 N-INVAS EAR/PLS OXIMETRY MLT: CPT

## 2018-01-26 PROCEDURE — G8987 SELF CARE CURRENT STATUS: HCPCS

## 2018-01-26 PROCEDURE — 6360000002 HC RX W HCPCS: Performed by: FAMILY MEDICINE

## 2018-01-26 PROCEDURE — G8979 MOBILITY GOAL STATUS: HCPCS

## 2018-01-26 PROCEDURE — 97167 OT EVAL HIGH COMPLEX 60 MIN: CPT

## 2018-01-26 PROCEDURE — 2580000003 HC RX 258: Performed by: FAMILY MEDICINE

## 2018-01-26 PROCEDURE — 97530 THERAPEUTIC ACTIVITIES: CPT

## 2018-01-26 PROCEDURE — 97163 PT EVAL HIGH COMPLEX 45 MIN: CPT

## 2018-01-26 PROCEDURE — 82947 ASSAY GLUCOSE BLOOD QUANT: CPT

## 2018-01-26 PROCEDURE — 2060000000 HC ICU INTERMEDIATE R&B

## 2018-01-26 PROCEDURE — 2580000003 HC RX 258: Performed by: INTERNAL MEDICINE

## 2018-01-26 PROCEDURE — S0028 INJECTION, FAMOTIDINE, 20 MG: HCPCS | Performed by: INTERNAL MEDICINE

## 2018-01-26 PROCEDURE — 94660 CPAP INITIATION&MGMT: CPT

## 2018-01-26 PROCEDURE — 6370000000 HC RX 637 (ALT 250 FOR IP): Performed by: INTERNAL MEDICINE

## 2018-01-26 PROCEDURE — 80048 BASIC METABOLIC PNL TOTAL CA: CPT

## 2018-01-26 PROCEDURE — G8978 MOBILITY CURRENT STATUS: HCPCS

## 2018-01-26 PROCEDURE — 2500000003 HC RX 250 WO HCPCS: Performed by: INTERNAL MEDICINE

## 2018-01-26 PROCEDURE — 6360000002 HC RX W HCPCS: Performed by: INTERNAL MEDICINE

## 2018-01-26 PROCEDURE — 85025 COMPLETE CBC W/AUTO DIFF WBC: CPT

## 2018-01-26 PROCEDURE — 6370000000 HC RX 637 (ALT 250 FOR IP): Performed by: FAMILY MEDICINE

## 2018-01-26 PROCEDURE — 94640 AIRWAY INHALATION TREATMENT: CPT

## 2018-01-26 PROCEDURE — 71045 X-RAY EXAM CHEST 1 VIEW: CPT

## 2018-01-26 PROCEDURE — 97535 SELF CARE MNGMENT TRAINING: CPT

## 2018-01-26 PROCEDURE — G8988 SELF CARE GOAL STATUS: HCPCS

## 2018-01-26 RX ORDER — TAMSULOSIN HYDROCHLORIDE 0.4 MG/1
0.4 CAPSULE ORAL DAILY
Status: DISCONTINUED | OUTPATIENT
Start: 2018-01-27 | End: 2018-01-30

## 2018-01-26 RX ORDER — AMLODIPINE BESYLATE 5 MG/1
5 TABLET ORAL ONCE
Status: COMPLETED | OUTPATIENT
Start: 2018-01-26 | End: 2018-01-26

## 2018-01-26 RX ORDER — DEXTROSE MONOHYDRATE 50 MG/ML
INJECTION, SOLUTION INTRAVENOUS CONTINUOUS
Status: DISCONTINUED | OUTPATIENT
Start: 2018-01-26 | End: 2018-01-31

## 2018-01-26 RX ORDER — AMLODIPINE BESYLATE 5 MG/1
5 TABLET ORAL 2 TIMES DAILY
Status: DISCONTINUED | OUTPATIENT
Start: 2018-01-26 | End: 2018-02-02

## 2018-01-26 RX ORDER — FLUCONAZOLE, SODIUM CHLORIDE 2 MG/ML
100 INJECTION INTRAVENOUS EVERY 24 HOURS
Status: COMPLETED | OUTPATIENT
Start: 2018-01-26 | End: 2018-02-01

## 2018-01-26 RX ORDER — PREDNISONE 20 MG/1
20 TABLET ORAL 2 TIMES DAILY
Status: DISCONTINUED | OUTPATIENT
Start: 2018-01-26 | End: 2018-01-28

## 2018-01-26 RX ORDER — SODIUM CHLORIDE 450 MG/100ML
INJECTION, SOLUTION INTRAVENOUS CONTINUOUS
Status: DISCONTINUED | OUTPATIENT
Start: 2018-01-26 | End: 2018-01-26

## 2018-01-26 RX ORDER — POTASSIUM CHLORIDE 29.8 MG/ML
20 INJECTION INTRAVENOUS PRN
Status: DISCONTINUED | OUTPATIENT
Start: 2018-01-26 | End: 2018-01-28

## 2018-01-26 RX ADMIN — IPRATROPIUM BROMIDE 0.5 MG: 0.5 SOLUTION RESPIRATORY (INHALATION) at 11:31

## 2018-01-26 RX ADMIN — PREDNISONE 20 MG: 20 TABLET ORAL at 11:45

## 2018-01-26 RX ADMIN — LEVALBUTEROL HYDROCHLORIDE 1.25 MG: 1.25 SOLUTION, CONCENTRATE RESPIRATORY (INHALATION) at 11:31

## 2018-01-26 RX ADMIN — FAMOTIDINE 20 MG: 10 INJECTION, SOLUTION INTRAVENOUS at 07:46

## 2018-01-26 RX ADMIN — INSULIN LISPRO 2 UNITS: 100 INJECTION, SOLUTION INTRAVENOUS; SUBCUTANEOUS at 05:08

## 2018-01-26 RX ADMIN — PREDNISONE 20 MG: 20 TABLET ORAL at 21:16

## 2018-01-26 RX ADMIN — LEVALBUTEROL HYDROCHLORIDE 1.25 MG: 1.25 SOLUTION, CONCENTRATE RESPIRATORY (INHALATION) at 21:39

## 2018-01-26 RX ADMIN — LEVALBUTEROL HYDROCHLORIDE 1.25 MG: 1.25 SOLUTION, CONCENTRATE RESPIRATORY (INHALATION) at 15:48

## 2018-01-26 RX ADMIN — CARBIDOPA AND LEVODOPA 1 TABLET: 25; 100 TABLET ORAL at 21:16

## 2018-01-26 RX ADMIN — IPRATROPIUM BROMIDE 0.5 MG: 0.5 SOLUTION RESPIRATORY (INHALATION) at 15:47

## 2018-01-26 RX ADMIN — IPRATROPIUM BROMIDE 0.5 MG: 0.5 SOLUTION RESPIRATORY (INHALATION) at 07:50

## 2018-01-26 RX ADMIN — PANTOPRAZOLE SODIUM 20 MG: 40 INJECTION, POWDER, FOR SOLUTION INTRAVENOUS at 21:16

## 2018-01-26 RX ADMIN — Medication 3.38 G: at 21:17

## 2018-01-26 RX ADMIN — METHYLPREDNISOLONE SODIUM SUCCINATE 30 MG: 40 INJECTION, POWDER, FOR SOLUTION INTRAMUSCULAR; INTRAVENOUS at 07:45

## 2018-01-26 RX ADMIN — CARBIDOPA AND LEVODOPA 1 TABLET: 25; 100 TABLET ORAL at 08:23

## 2018-01-26 RX ADMIN — LEVALBUTEROL HYDROCHLORIDE 1.25 MG: 1.25 SOLUTION, CONCENTRATE RESPIRATORY (INHALATION) at 07:50

## 2018-01-26 RX ADMIN — DEXTROSE MONOHYDRATE: 50 INJECTION, SOLUTION INTRAVENOUS at 13:07

## 2018-01-26 RX ADMIN — CARBIDOPA AND LEVODOPA 1 TABLET: 25; 100 TABLET ORAL at 15:02

## 2018-01-26 RX ADMIN — NYSTATIN 500000 UNITS: 100000 SUSPENSION ORAL at 21:16

## 2018-01-26 RX ADMIN — DONEPEZIL HYDROCHLORIDE 10 MG: 10 TABLET, FILM COATED ORAL at 21:16

## 2018-01-26 RX ADMIN — AMLODIPINE BESYLATE 5 MG: 5 TABLET ORAL at 21:16

## 2018-01-26 RX ADMIN — Medication 10 ML: at 21:17

## 2018-01-26 RX ADMIN — IPRATROPIUM BROMIDE 0.5 MG: 0.5 SOLUTION RESPIRATORY (INHALATION) at 21:39

## 2018-01-26 RX ADMIN — Medication 3.38 G: at 15:02

## 2018-01-26 RX ADMIN — TAMSULOSIN HYDROCHLORIDE 0.4 MG: 0.4 CAPSULE ORAL at 08:25

## 2018-01-26 RX ADMIN — INSULIN LISPRO 2 UNITS: 100 INJECTION, SOLUTION INTRAVENOUS; SUBCUTANEOUS at 21:18

## 2018-01-26 RX ADMIN — SODIUM CHLORIDE: 4.5 INJECTION, SOLUTION INTRAVENOUS at 08:21

## 2018-01-26 RX ADMIN — POTASSIUM CHLORIDE 20 MEQ: 29.8 INJECTION, SOLUTION INTRAVENOUS at 09:11

## 2018-01-26 RX ADMIN — BUDESONIDE 500 MCG: 0.5 SUSPENSION RESPIRATORY (INHALATION) at 07:50

## 2018-01-26 RX ADMIN — AMLODIPINE BESYLATE 5 MG: 10 TABLET ORAL at 13:02

## 2018-01-26 RX ADMIN — INSULIN LISPRO 2 UNITS: 100 INJECTION, SOLUTION INTRAVENOUS; SUBCUTANEOUS at 17:54

## 2018-01-26 RX ADMIN — ACETYLCYSTEINE 800 MG: 200 SOLUTION ORAL; RESPIRATORY (INHALATION) at 21:40

## 2018-01-26 RX ADMIN — AZITHROMYCIN MONOHYDRATE 500 MG: 500 INJECTION, POWDER, LYOPHILIZED, FOR SOLUTION INTRAVENOUS at 17:53

## 2018-01-26 RX ADMIN — INSULIN LISPRO 1 UNITS: 100 INJECTION, SOLUTION INTRAVENOUS; SUBCUTANEOUS at 00:03

## 2018-01-26 RX ADMIN — Medication 3.38 G: at 05:08

## 2018-01-26 RX ADMIN — LEVALBUTEROL HYDROCHLORIDE 1.25 MG: 1.25 SOLUTION, CONCENTRATE RESPIRATORY (INHALATION) at 03:12

## 2018-01-26 RX ADMIN — ACETYLCYSTEINE 600 MG: 200 SOLUTION ORAL; RESPIRATORY (INHALATION) at 07:50

## 2018-01-26 RX ADMIN — POTASSIUM CHLORIDE 20 MEQ: 29.8 INJECTION, SOLUTION INTRAVENOUS at 12:00

## 2018-01-26 RX ADMIN — INSULIN LISPRO 1 UNITS: 100 INJECTION, SOLUTION INTRAVENOUS; SUBCUTANEOUS at 13:03

## 2018-01-26 RX ADMIN — FLUCONAZOLE 100 MG: 2 INJECTION INTRAVENOUS at 21:16

## 2018-01-26 ASSESSMENT — PAIN DESCRIPTION - PAIN TYPE: TYPE: ACUTE PAIN

## 2018-01-26 ASSESSMENT — PAIN DESCRIPTION - LOCATION: LOCATION: GENERALIZED

## 2018-01-26 ASSESSMENT — PAIN SCALES - GENERAL: PAINLEVEL_OUTOF10: 5

## 2018-01-26 NOTE — PROGRESS NOTES
Occupational Therapy   Occupational Therapy Initial Assessment  Date: 2018   Patient Name: Harmony Eason  MRN: [id number]     : 1935    Patient Diagnosis(es): The primary encounter diagnosis was Influenza. Diagnoses of Dehydration, Pneumonia of both lungs due to infectious organism, unspecified part of lung, and Acute hypoxemic respiratory failure ([facility] [address] were also pertinent to this visit. has a past medical history of Arthritis; Cerebral artery occlusion with cerebral infarction ([address] Chronic kidney disease; COPD (chronic obstructive pulmonary disease) ([facility] [address] Hyperlipidemia; Hypertension; Pneumonia; and Raynaud's disease. has a past surgical history that includes Cardiac surgery; Carotid-subclavian Bypass Graft; and Carotid endarterectomy (Left). RN reports patient is medically stable for therapy treatment this date. Chart reviewed prior to treatment and patient is agreeable for therapy. All lines intact and patient positioned comfortably at end of treatment. All patient needs addressed prior to ending therapy session. Discharge Recommendation: SNF     Harmony Eason is a  80 y.o.  male who presents with Fatigue and Altered Mental Status   hx of recent cough fever chill weakness failure to respond to oral hydration and antibiotics          Restrictions  Restrictions/Precautions  Restrictions/Precautions: Fall Risk (per dtr old CVA and R inattention )  Position Activity Restriction  Other position/activity restrictions: droplet prec, puree diet, rectal tube, kumar, central line, RUE IV, DNRCC, per nurse up as able this date for eval     Subjective   General  Chart Reviewed: Yes  Patient assessed for rehabilitation services?: Yes  Family / Caregiver Present: Yes (Dtr )  Pain Assessment  Patient Currently in Pain: Yes  Pain Assessment:  (Pt unable to rate pain due to cognitive deficits )  Pain Level: 0  RASS Score (Ventilated):  Moderate Sedation - Patient has any movement in bed;Gait belt;Nurse notified  OT Equipment Recommendations  Equipment Needed: Yes  Mobility Devices: Wheelchair (pt has a transport w/c )  Wheelchair: Standard        Discharge Recommendations:  [facility] [facility]  Times per week: 4-5x/week 1x/day   Current Treatment Recommendations: Strengthening, ROM, Balance Training, Functional Mobility Training, Positioning, Safety Education & Training, Pain Management, Cognitive Reorientation, Wheelchair Mobility Training, Endurance Training, Neuromuscular Re-education, Patient/Caregiver Education & Training, Equipment Evaluation, Education, & procurement, Self-Care / ADL, Cognitive/Perceptual Training    G-Code  OT G-codes  Functional Assessment Tool Used: AM-PAC   Score: 6  Functional Limitation: Self care  Self Care Current Status (): 100 percent impaired, limited or restricted  Self Care Goal Status (): At least 60 percent but less than 80 percent impaired, limited or restricted  OutComes Score                                           AM-PAC Score        AM-PAC Inpatient Daily Activity Raw Score: 6  AM-PAC Inpatient ADL T-Scale Score : 17.07  ADL Inpatient CMS 0-100% Score: 100  ADL Inpatient CMS G-Code Modifier : CN    Goals  Short term goals  Time Frame for Short term goals: By discharge, pt to demo  (Assess transfers and mobility as appropriate and goals to be set as needed. )  Short term goal 1: min assist with self feeding with AE as needed and with min verbal instruction/tactile assist.    Short term goal 2: caregivers to demo good understanding of BUE HEP with hand outs as needed. Short term goal 3: bed mob techniques with min assist with use of rail. Short term goal 4: postural control seated at EOB with SBA and > 20 mins. Short term goal 5: simple groom tasks with min assist sitting EOB.     Patient Goals   Patient goals : pt unable to state; per dtr eventually home with spouse        Therapy Time   Individual Concurrent Group Co-treatment   Time In 0840 (plus 10 min chart review )         Time Out 0912         Minutes [address] [place], [place]

## 2018-01-26 NOTE — PLAN OF CARE
Problem: Nutrition  Goal: Optimal nutrition therapy  Nutrition Problem: Inadequate oral intake  Intervention: Food and/or Nutrient Delivery: Continue current diet, Modify current ONS  Nutritional Goals: PO intake > 75% of estimated kcal/protein need with appropriate diet consistency/texture       Outcome: Ongoing

## 2018-01-26 NOTE — PROGRESS NOTES
infusion (premix) Q8H   ipratropium (ATROVENT) 0.02 % nebulizer solution 0.5 mg 4x daily   levalbuterol (XOPENEX) nebulizer solution 1.25 mg Q4H   budesonide (PULMICORT) nebulizer suspension 500 mcg BID   acetylcysteine (MUCOMYST) 20 % solution 600 mg TID   famotidine (PEPCID) injection 20 mg Daily   pantoprazole (PROTONIX) injection 20 mg Nightly   And    sodium chloride (PF) 0.9 % injection 10 mL Daily   sodium chloride flush 0.9 % injection 10 mL 2 times per day   sodium chloride flush 0.9 % injection 10 mL PRN   azithromycin (ZITHROMAX) 500 mg in D5W 250ml addavial Q24H   oseltamivir (TAMIFLU) capsule 30 mg Daily   acetaminophen (TYLENOL) 160 MG/5ML solution 650 mg Q4H PRN   carbidopa-levodopa (SINEMET)  MG per tablet 1 tablet TID   donepezil (ARICEPT) tablet 10 mg Nightly   tamsulosin (FLOMAX) capsule 0.4 mg Daily       Labs:    CBC:  Recent Labs      01/24/18   0519  01/25/18   0542  01/26/18   0848   WBC  8.4  7.2  8.8   RBC  3.57*  3.64*  3.61*   HGB  10.7*  11.0*  10.9*   HCT  32.8*  33.8*  33.0*   MCV  91.8  92.9  91.2   MCH  30.1  30.2  30.3   MCHC  32.8  32.5  33.2   RDW  13.9  14.8*  14.5   PLT  172  149  188   MPV  9.4  9.5  8.7     Chemistry:  Recent Labs      01/24/18   0519  01/25/18   0542  01/26/18   0508   NA  146*  150*  152*   K  4.1  3.7  3.3*   CL  115*  118*  119*   CO2  20  20  21   GLUCOSE  208*  197*  217*   BUN  79*  67*  56*   CREATININE  1.80*  1.59*  1.40*   ANIONGAP  11  12  12   LABGLOM  36*  42*  48*   GFRAA  44*  51*  59*   CALCIUM  8.2*  8.3*  8.3*     Recent Labs      01/24/18   1347   PROT  4.8*     Albumin:   Lab Results   Component Value Date    LABALBU 2.4 01/22/2018   Urine Protein:    Lab Results   Component Value Date    PROTEINU 1+ 01/24/2018         Radiology:    Kidney US:  1.  No hydronephrosis.       2.  Acoustic shadowing in the expected location of the gallbladder suggesting   contracted gallbladder with multiple calculi.  Recommend dedicated follow-up gallbladder ultrasound. 2d ECHO:  Technically difficult study. Left ventricle is normal in size and wall thickness. Global left ventricular systolic function appears normal with estimated EF  of 55% but difficult to assess due to limited visualization. All segments not well visualized. Cannot rule-out abnormal segmental wall  motion. No significant valvular regurgitation or stenosis seen. No pericardial effusion is seen. Assessment:  1. Acute kidney injury: likely secondary to toxic acute tubular necrosis, non oliguric, improving  2. HTN, uncontrolled  3. Respiratory failure currently on nasal  BiPAP  4. Influenza A  5. Multifocal pneumonia, aspiration pneumonia  6. Parkinson's disease  7. Ischemic heart disease with prior CABG echocardiogram demonstrating preserved EF            8.   Hypernatremia            9.   Hypokalemia  Plan:  Blood pressure still elevated   Norvasc was increased yesterday,m monitor blood pressure off normal saline   Change IV to D5 W, monitor glucoses levels. Recheck serum sodium in the afternoon  Potassium replaced  Avoid nephrotoxic agents and IV contrast  We'll continue to follow  Please do not hesitate to call with questions.     Electronically signed by Dallas Edgar MD on 1/26/2018 at 12:49 PM

## 2018-01-26 NOTE — PROGRESS NOTES
Nutrition Assessment    Type and Reason for Visit: Reassess    Nutrition Recommendations: 1. Continue Dysphagia I diet 2. Add Magic Cup ONS at L + D, 3. Allow family to bring in Boost ONS to replace house Ensure ONS. 4. Continue feeding assistance as needed. 5. Continue to monitor for possible signs or symptoms of aspiration. 6. Monitor renal labs     Malnutrition Assessment:  · Malnutrition Status: At risk for malnutrition  · Context: Acute illness or injury  · Findings of the 6 clinical characteristics of malnutrition (Minimum of 2 out of 6 clinical characteristics is required to make the diagnosis of moderate or severe Protein Calorie Malnutrition based on AND/ASPEN Guidelines):  1. Energy Intake-Less than or equal to 50%, not able to assess    2. Weight Loss-No significant weight loss,    3. Fat Loss-Unable to assess,    4. Muscle Loss-Unable to assess,    5. Fluid Accumulation-No significant fluid accumulation, Extremities  6.  Strength-Not measured    Nutrition Diagnosis:   · Problem: Inadequate oral intake  · Etiology: related to Cognitive or neurological impairment, Lack of self-feeding ability (swallow difficulty)     Signs and symptoms:  as evidenced by  (swallow difficulty, weakness, SOB)    Nutrition Assessment:  · Subjective Assessment: Received consult this morning to add Magic cup per pt request. Pts family present. Wife is feeding pt breakfast, and per wife she thinks some odynophagia is present as he grimices Spoke also with TAMRA Massey concerning any improvement in his eating. Pt noted to have minimal intakes. Added Magic Cup 2x/day, and per pt wife, pt favors Boost Oral Nutrition Supplement (ONS) to our house Ensure Enlive.    · Nutrition-Focused Physical Findings: GI: +flat, +soft, +diarrhea: 1/26, +active bowel sounds, +PV: WDL   · Wound Type: None  · Current Nutrition Therapies:  · Oral Diet Orders: Dysphagia 1 (Pureed)   · Oral Diet intake: 1-25%  · Oral Nutrition Supplement (ONS) Orders: None  · ONS intake: Unable to assess  · Additional Calories: D5% IVF: 265 kcal  · Anthropometric Measures:  · Ht: 5' 2\" (157.5 cm)   · Admission Body Wt: 125 lb (56.7 kg)  · Ideal Body Wt: 118 lb (53.5 kg), % Ideal Body 106%  · BMI Classification: BMI 18.5 - 24.9 Normal Weight  · Comparative Standards (Estimated Nutrition Needs):  · Estimated Daily Total Kcal: 5206-0096  · Estimated Daily Protein (g): 55-65  · Estimated Daily Fluids (mL): 7991-0742    Estimated Intake vs Estimated Needs: Intake Less Than Needs    Nutrition Risk Level: High    Nutrition Interventions:   Continue current diet, Modify current ONS  Continued Inpatient Monitoring, Coordination of Care    Nutrition Evaluation:   · Evaluation: Progressing toward goals   · Goals: PO intake > 75% of estimated kcal/protein need with appropriate diet consistency/texture    · Monitoring: Diet Progression, NPO Status, Mental Status/Confusion, Weight, Chewing/Swallowing, Diarrhea, Constipation    See Adult Nutrition Doc Flowsheet for more detail.      Electronically signed by Kenneth KENT, [place], LD on 1/26/2018 at 4:56 PM    Contact Number:  [phone]

## 2018-01-26 NOTE — PROGRESS NOTES
at home per dtr; all info per dtr as pt is a poor historian and with cognitive deficits   Objective          AROM RLE (degrees)  RLE AROM: WFL  AROM LLE (degrees)  LLE AROM : WFL  AROM RUE (degrees)  RUE General AROM: see Ot assessment  AROM LUE (degrees)  LUE General AROM: see OT assessment  Strength RLE  Comment: 3+/5 gross strength  Strength LLE  Comment: 3+/5 gross strength  Strength RUE  Comment: see OT assessment  Strength LUE  Comment: see OT assessment  Motor Control  Gross Motor?: WFL  Sensation  Overall Sensation Status: WFL  Bed mobility  Rolling to Left: Maximum assistance  Rolling to Right: Maximum assistance  Supine to Sit: Maximum assistance  Sit to Supine: Maximum assistance  Scooting: Maximal assistance (of 2)   Pt sat at EOB for 10 minutes, Juan for sitting balance initially but after 5 minutes pt able to maintain static sitting with Contact Guard Assistance      Transfers  Sit to Stand: Unable to assess  Ambulation  Ambulation?: No     Balance  Sitting - Static: Fair;+  Sitting - Dynamic: Fair;- (leaning to left side)    Pt sat EOB x 10 minutes then completed sit to supine with maxA, scoot to [facility] required 2maxA  Pt tolerated 10 reps of AROM for Russell LE's. Pt then rolled to R side with maxA & pillow propped behind back & under heels for pressure relief. All lines intact, call light within reach, and patient positioned comfortably at end of treatment. All patient needs addressed prior to ending therapy session. Assessment   Body structures, Functions, Activity limitations: Decreased functional mobility ; Decreased strength;Decreased endurance;Decreased balance  Pt is unsafe for D/C to home at current level of function, impaired balance & activity tolerance,  & is appropriate to D/C to [address] [facility] to restore strength, functional mobility independence, safety awareness, balance & activity tolerance to [facility]      Specific instructions for Next Treatment: Initiate transfers OOB as appropriate  Prognosis: Good  Decision Making: Low Complexity  Exam:  ROM, MMT, functional mobility, activity tolerance, Balance, & THE [facility] Physical Therapy Acute Care Functional Outcomes  Clinical Presentation: unstable  Patient Education: PT POC, functional mobility  Barriers to Learning: none  REQUIRES PT FOLLOW UP: Yes  Activity Tolerance  Activity Tolerance: Patient limited by endurance; Patient limited by fatigue     Discharge Recommendations:  [address]  Times per week: 1-2x/D, 6-7D/week  Specific instructions for Next Treatment: Initiate transfers OOB as appropriate  Current Treatment Recommendations: Strengthening, Balance Training, Functional Mobility Training, Home Exercise Program, Safety Education & Training, Patient/Caregiver Education & Training    G-Code    Functional Assessment Tool Used:  THE [facility] Physical Therapy Acute [facility] Functional Outcomes  Score: 2  Functional Limitation: Mobility: Walking and moving around  Mobility: Walking and Moving Around Current Status (): At least 80 percent but less than 100 percent impaired, limited or restricted  Mobility: Walking and Moving Around Goal Status (): At least 20 percent but less than 40 percent impaired, limited or restricted         Goals  Short term goals  Time Frame for Short term goals: 12 visits  Short term goal 1: Inc bed mobility to CGA or better; Short term goal 2: Assess transfers/gait & set goals as appropriate; Short term goal 3: Inc strength to [facility] to facilitate pt independence with functional mobility; Short term goal 4: Pt able to tolerate 30 min of activity to include 15-20 reps of ex & functional mobility to facilitate better activity tolerance;   Short term goal 5: Pt to tolerate sitting EOB 20 minutes to increase trunk control & enable pt to transfer OOB & initiate ambulation;  Patient Goals   Patient goals : able to return home

## 2018-01-27 ENCOUNTER — APPOINTMENT (OUTPATIENT)
Dept: GENERAL RADIOLOGY | Age: 83
DRG: 193 | End: 2018-01-27
Payer: MEDICARE

## 2018-01-27 LAB
ABSOLUTE EOS #: 0 K/UL (ref 0–0.4)
ABSOLUTE IMMATURE GRANULOCYTE: ABNORMAL K/UL (ref 0–0.3)
ABSOLUTE LYMPH #: 0.3 K/UL (ref 1–4.8)
ABSOLUTE MONO #: 0.4 K/UL (ref 0.2–0.8)
ANION GAP SERPL CALCULATED.3IONS-SCNC: 13 MMOL/L (ref 9–17)
BASOPHILS # BLD: 0 % (ref 0–2)
BASOPHILS ABSOLUTE: 0 K/UL (ref 0–0.2)
BUN BLDV-MCNC: 45 MG/DL (ref 8–23)
BUN/CREAT BLD: 34 (ref 9–20)
CALCIUM SERPL-MCNC: 8.1 MG/DL (ref 8.6–10.4)
CHLORIDE BLD-SCNC: 117 MMOL/L (ref 98–107)
CO2: 20 MMOL/L (ref 20–31)
CREAT SERPL-MCNC: 1.32 MG/DL (ref 0.7–1.2)
DIFFERENTIAL TYPE: ABNORMAL
EOSINOPHILS RELATIVE PERCENT: 0 % (ref 1–4)
GFR AFRICAN AMERICAN: >60 ML/MIN
GFR NON-AFRICAN AMERICAN: 52 ML/MIN
GFR SERPL CREATININE-BSD FRML MDRD: ABNORMAL ML/MIN/{1.73_M2}
GFR SERPL CREATININE-BSD FRML MDRD: ABNORMAL ML/MIN/{1.73_M2}
GLUCOSE BLD-MCNC: 141 MG/DL (ref 75–110)
GLUCOSE BLD-MCNC: 162 MG/DL (ref 75–110)
GLUCOSE BLD-MCNC: 182 MG/DL (ref 75–110)
GLUCOSE BLD-MCNC: 186 MG/DL (ref 70–99)
HCT VFR BLD CALC: 31.1 % (ref 41–53)
HEMOGLOBIN: 10.2 G/DL (ref 13.5–17.5)
IMMATURE GRANULOCYTES: ABNORMAL %
LYMPHOCYTES # BLD: 3 % (ref 24–44)
MCH RBC QN AUTO: 29.9 PG (ref 26–34)
MCHC RBC AUTO-ENTMCNC: 32.7 G/DL (ref 31–37)
MCV RBC AUTO: 91.3 FL (ref 80–100)
MONOCYTES # BLD: 3 % (ref 1–7)
NRBC AUTOMATED: ABNORMAL PER 100 WBC
PDW BLD-RTO: 14.3 % (ref 11.5–14.5)
PLATELET # BLD: 201 K/UL (ref 130–400)
PLATELET ESTIMATE: ABNORMAL
PMV BLD AUTO: 8.6 FL (ref 6–12)
POTASSIUM SERPL-SCNC: 4 MMOL/L (ref 3.7–5.3)
RBC # BLD: 3.4 M/UL (ref 4.5–5.9)
RBC # BLD: ABNORMAL 10*6/UL
SEG NEUTROPHILS: 94 % (ref 36–66)
SEGMENTED NEUTROPHILS ABSOLUTE COUNT: 10.3 K/UL (ref 1.8–7.7)
SODIUM BLD-SCNC: 148 MMOL/L (ref 135–144)
SODIUM BLD-SCNC: 150 MMOL/L (ref 135–144)
WBC # BLD: 10.9 K/UL (ref 3.5–11)
WBC # BLD: ABNORMAL 10*3/UL

## 2018-01-27 PROCEDURE — 6360000002 HC RX W HCPCS: Performed by: FAMILY MEDICINE

## 2018-01-27 PROCEDURE — 94761 N-INVAS EAR/PLS OXIMETRY MLT: CPT

## 2018-01-27 PROCEDURE — 80048 BASIC METABOLIC PNL TOTAL CA: CPT

## 2018-01-27 PROCEDURE — 2580000003 HC RX 258: Performed by: INTERNAL MEDICINE

## 2018-01-27 PROCEDURE — 84295 ASSAY OF SERUM SODIUM: CPT

## 2018-01-27 PROCEDURE — 6360000002 HC RX W HCPCS: Performed by: INTERNAL MEDICINE

## 2018-01-27 PROCEDURE — 71045 X-RAY EXAM CHEST 1 VIEW: CPT

## 2018-01-27 PROCEDURE — 97164 PT RE-EVAL EST PLAN CARE: CPT

## 2018-01-27 PROCEDURE — S0028 INJECTION, FAMOTIDINE, 20 MG: HCPCS | Performed by: INTERNAL MEDICINE

## 2018-01-27 PROCEDURE — 2580000003 HC RX 258: Performed by: FAMILY MEDICINE

## 2018-01-27 PROCEDURE — 51702 INSERT TEMP BLADDER CATH: CPT

## 2018-01-27 PROCEDURE — 94640 AIRWAY INHALATION TREATMENT: CPT

## 2018-01-27 PROCEDURE — G8979 MOBILITY GOAL STATUS: HCPCS

## 2018-01-27 PROCEDURE — 2500000003 HC RX 250 WO HCPCS: Performed by: INTERNAL MEDICINE

## 2018-01-27 PROCEDURE — 97530 THERAPEUTIC ACTIVITIES: CPT

## 2018-01-27 PROCEDURE — 6370000000 HC RX 637 (ALT 250 FOR IP): Performed by: FAMILY MEDICINE

## 2018-01-27 PROCEDURE — 2060000000 HC ICU INTERMEDIATE R&B

## 2018-01-27 PROCEDURE — 97110 THERAPEUTIC EXERCISES: CPT

## 2018-01-27 PROCEDURE — 6370000000 HC RX 637 (ALT 250 FOR IP): Performed by: INTERNAL MEDICINE

## 2018-01-27 PROCEDURE — 82947 ASSAY GLUCOSE BLOOD QUANT: CPT

## 2018-01-27 PROCEDURE — 85025 COMPLETE CBC W/AUTO DIFF WBC: CPT

## 2018-01-27 PROCEDURE — G8978 MOBILITY CURRENT STATUS: HCPCS

## 2018-01-27 PROCEDURE — C9113 INJ PANTOPRAZOLE SODIUM, VIA: HCPCS | Performed by: FAMILY MEDICINE

## 2018-01-27 RX ORDER — FUROSEMIDE 10 MG/ML
20 INJECTION INTRAMUSCULAR; INTRAVENOUS ONCE
Status: DISCONTINUED | OUTPATIENT
Start: 2018-01-28 | End: 2018-01-28

## 2018-01-27 RX ORDER — 0.9 % SODIUM CHLORIDE 0.9 %
10 VIAL (ML) INJECTION DAILY
Status: DISCONTINUED | OUTPATIENT
Start: 2018-01-27 | End: 2018-02-05 | Stop reason: HOSPADM

## 2018-01-27 RX ORDER — PANTOPRAZOLE SODIUM 40 MG/10ML
40 INJECTION, POWDER, LYOPHILIZED, FOR SOLUTION INTRAVENOUS NIGHTLY
Status: DISCONTINUED | OUTPATIENT
Start: 2018-01-27 | End: 2018-02-05 | Stop reason: HOSPADM

## 2018-01-27 RX ADMIN — AMLODIPINE BESYLATE 5 MG: 5 TABLET ORAL at 21:28

## 2018-01-27 RX ADMIN — IPRATROPIUM BROMIDE 0.5 MG: 0.5 SOLUTION RESPIRATORY (INHALATION) at 11:39

## 2018-01-27 RX ADMIN — LEVALBUTEROL HYDROCHLORIDE 1.25 MG: 1.25 SOLUTION, CONCENTRATE RESPIRATORY (INHALATION) at 15:37

## 2018-01-27 RX ADMIN — DEXTROSE MONOHYDRATE: 50 INJECTION, SOLUTION INTRAVENOUS at 17:15

## 2018-01-27 RX ADMIN — LEVALBUTEROL HYDROCHLORIDE 1.25 MG: 1.25 SOLUTION, CONCENTRATE RESPIRATORY (INHALATION) at 19:24

## 2018-01-27 RX ADMIN — INSULIN LISPRO 4 UNITS: 100 INJECTION, SOLUTION INTRAVENOUS; SUBCUTANEOUS at 09:25

## 2018-01-27 RX ADMIN — CARBIDOPA AND LEVODOPA 1 TABLET: 25; 100 TABLET ORAL at 21:28

## 2018-01-27 RX ADMIN — CARBIDOPA AND LEVODOPA 1 TABLET: 25; 100 TABLET ORAL at 14:55

## 2018-01-27 RX ADMIN — NYSTATIN 500000 UNITS: 100000 SUSPENSION ORAL at 14:55

## 2018-01-27 RX ADMIN — PANTOPRAZOLE SODIUM 40 MG: 40 INJECTION, POWDER, FOR SOLUTION INTRAVENOUS at 21:28

## 2018-01-27 RX ADMIN — DONEPEZIL HYDROCHLORIDE 10 MG: 10 TABLET, FILM COATED ORAL at 21:28

## 2018-01-27 RX ADMIN — INSULIN LISPRO 2 UNITS: 100 INJECTION, SOLUTION INTRAVENOUS; SUBCUTANEOUS at 17:18

## 2018-01-27 RX ADMIN — INSULIN LISPRO 1 UNITS: 100 INJECTION, SOLUTION INTRAVENOUS; SUBCUTANEOUS at 21:29

## 2018-01-27 RX ADMIN — IPRATROPIUM BROMIDE 0.5 MG: 0.5 SOLUTION RESPIRATORY (INHALATION) at 15:37

## 2018-01-27 RX ADMIN — FAMOTIDINE 20 MG: 10 INJECTION, SOLUTION INTRAVENOUS at 09:19

## 2018-01-27 RX ADMIN — FLUCONAZOLE 100 MG: 2 INJECTION INTRAVENOUS at 21:27

## 2018-01-27 RX ADMIN — NYSTATIN 500000 UNITS: 100000 SUSPENSION ORAL at 21:28

## 2018-01-27 RX ADMIN — LEVALBUTEROL HYDROCHLORIDE 1.25 MG: 1.25 SOLUTION, CONCENTRATE RESPIRATORY (INHALATION) at 00:01

## 2018-01-27 RX ADMIN — LEVALBUTEROL HYDROCHLORIDE 1.25 MG: 1.25 SOLUTION, CONCENTRATE RESPIRATORY (INHALATION) at 11:34

## 2018-01-27 RX ADMIN — ACETYLCYSTEINE 600 MG: 200 SOLUTION ORAL; RESPIRATORY (INHALATION) at 19:24

## 2018-01-27 RX ADMIN — PREDNISONE 20 MG: 20 TABLET ORAL at 09:30

## 2018-01-27 RX ADMIN — NYSTATIN 500000 UNITS: 100000 SUSPENSION ORAL at 09:27

## 2018-01-27 RX ADMIN — Medication 3.38 G: at 12:36

## 2018-01-27 RX ADMIN — Medication 3.38 G: at 21:41

## 2018-01-27 RX ADMIN — LEVALBUTEROL HYDROCHLORIDE 1.25 MG: 1.25 SOLUTION, CONCENTRATE RESPIRATORY (INHALATION) at 05:33

## 2018-01-27 RX ADMIN — AZITHROMYCIN MONOHYDRATE 500 MG: 500 INJECTION, POWDER, LYOPHILIZED, FOR SOLUTION INTRAVENOUS at 17:15

## 2018-01-27 RX ADMIN — CARBIDOPA AND LEVODOPA 1 TABLET: 25; 100 TABLET ORAL at 09:31

## 2018-01-27 RX ADMIN — Medication 3.38 G: at 05:26

## 2018-01-27 RX ADMIN — TAMSULOSIN HYDROCHLORIDE 0.4 MG: 0.4 CAPSULE ORAL at 17:20

## 2018-01-27 RX ADMIN — AMLODIPINE BESYLATE 5 MG: 5 TABLET ORAL at 09:18

## 2018-01-27 RX ADMIN — Medication 10 ML: at 21:28

## 2018-01-27 RX ADMIN — INSULIN LISPRO 2 UNITS: 100 INJECTION, SOLUTION INTRAVENOUS; SUBCUTANEOUS at 12:39

## 2018-01-27 RX ADMIN — Medication 10 ML: at 09:23

## 2018-01-27 RX ADMIN — IPRATROPIUM BROMIDE 0.5 MG: 0.5 SOLUTION RESPIRATORY (INHALATION) at 05:32

## 2018-01-27 RX ADMIN — ACETYLCYSTEINE 600 MG: 200 SOLUTION ORAL; RESPIRATORY (INHALATION) at 05:33

## 2018-01-27 RX ADMIN — IPRATROPIUM BROMIDE 0.5 MG: 0.5 SOLUTION RESPIRATORY (INHALATION) at 19:24

## 2018-01-27 RX ADMIN — Medication 10 ML: at 21:29

## 2018-01-27 RX ADMIN — LEVALBUTEROL HYDROCHLORIDE 1.25 MG: 1.25 SOLUTION, CONCENTRATE RESPIRATORY (INHALATION) at 23:32

## 2018-01-27 ASSESSMENT — PAIN SCALES - GENERAL: PAINLEVEL_OUTOF10: 0

## 2018-01-27 NOTE — PROGRESS NOTES
transfer OOB & initiate ambulation;  Patient Goals   Patient goals : able to return home to living with my wife    Plan    Plan  Times per week: 1-2x/D, 6-7D/week  Specific instructions for Next Treatment: try cy haynes  Current Treatment Recommendations: Strengthening, Balance Training, Functional Mobility Training, Home Exercise Program, Safety Education & Training, Patient/Caregiver Education & Training  Safety Devices  Type of devices:  All fall risk precautions in place, Call light within reach, Gait belt, Left in chair, Nurse notified (in recliner with daughter present, daughter aware that she should not leave patient alone in recliner.)  Restraints  Initially in place: No     Therapy Time   Individual Concurrent Group Co-treatment   Time In 1041         Time Out 1129         Minutes [facility],

## 2018-01-27 NOTE — PROGRESS NOTES
Nephrology Progress Note        Subjective:   States breathing is better. Eating breakfast with assistance. Was able to eat this morning  Serum sodium increased to 152 yesterday and today is 150. Creatinine stable at 1.32 today. Objective:  CURRENT TEMPERATURE:  Temp: 98.2 °F (36.8 °C)  MAXIMUM TEMPERATURE OVER 24HRS:  Temp (24hrs), Av.9 °F (37.2 °C), Min:98.2 °F (36.8 °C), Max:99.5 °F (37.5 °C)    CURRENT RESPIRATORY RATE:  Resp: 22  CURRENT PULSE:  Pulse: 80  CURRENT BLOOD PRESSURE:  BP: (!) 152/60  24HR BLOOD PRESSURE RANGE:  Systolic (50GTQ), XHN:268 , Min:133 , ADC:356   ; Diastolic (98YCB), AYQ:64, Min:56, Max:91    24HR INTAKE/OUTPUT:      Intake/Output Summary (Last 24 hours) at 18 0856  Last data filed at 18 0615   Gross per 24 hour   Intake             4204 ml   Output             1430 ml   Net             2774 ml     Weight   Wt Readings from Last 3 Encounters:   18 125 lb (56.7 kg)       Physical Exam:  General: Mild dysnpea,, no acute distress, alert and with flat affect  Skin: warm and dry, no rash or erythema  Pulmonary: Coarse rhonchi bilaterally, symmetrical.    Cardiovascular: normal rate, normal S1 and S2, no gallops.   Abdomen: soft nontender, bowel sounds present, no organomegaly,  no ascites  Extremities: no cyanosis, clubbing or edema    Current Medications:      potassium chloride 20 mEq/50 mL IVPB (Central Line) PRN   predniSONE (DELTASONE) tablet 20 mg BID   dextrose 5 % solution Continuous   tamsulosin (FLOMAX) capsule 0.4 mg Daily   amLODIPine (NORVASC) tablet 5 mg BID   nystatin (MYCOSTATIN) [id number] UNIT/ML suspension 500,000 Units TID   fluconazole (DIFLUCAN) 100 mg in 0.9 % NaCl 50 mL premix IVPB Q24H   insulin lispro (HUMALOG) injection vial 0-12 Units TID WC   insulin lispro (HUMALOG) injection vial 0-6 Units Nightly   glucose (GLUTOSE) 40 % oral gel 15 g PRN   dextrose 50 % solution 12.5 g PRN   glucagon (rDNA) injection 1 mg PRN   dextrose 5 % solution PRN ondansetron (ZOFRAN) injection 4 mg Q6H PRN   hydrALAZINE (APRESOLINE) injection 10 mg Q6H PRN   acetaminophen (TYLENOL) suppository 650 mg Q4H PRN   piperacillin-tazobactam (ZOSYN) 3.375 g in dextrose 50 mL IVPB extended infusion (premix) Q8H   ipratropium (ATROVENT) 0.02 % nebulizer solution 0.5 mg 4x daily   levalbuterol (XOPENEX) nebulizer solution 1.25 mg Q4H   acetylcysteine (MUCOMYST) 20 % solution 600 mg TID   famotidine (PEPCID) injection 20 mg Daily   pantoprazole (PROTONIX) injection 20 mg Nightly   And    sodium chloride (PF) 0.9 % injection 10 mL Daily   sodium chloride flush 0.9 % injection 10 mL 2 times per day   sodium chloride flush 0.9 % injection 10 mL PRN   azithromycin (ZITHROMAX) 500 mg in D5W 250ml addavial Q24H   acetaminophen (TYLENOL) 160 MG/5ML solution 650 mg Q4H PRN   carbidopa-levodopa (SINEMET)  MG per tablet 1 tablet TID   donepezil (ARICEPT) tablet 10 mg Nightly       Labs:    CBC:  Recent Labs      01/25/18   0542  01/26/18   0848  01/27/18   0422   WBC  7.2  8.8  10.9   RBC  3.64*  3.61*  3.40*   HGB  11.0*  10.9*  10.2*   HCT  33.8*  33.0*  31.1*   MCV  92.9  91.2  91.3   MCH  30.2  30.3  29.9   MCHC  32.5  33.2  32.7   RDW  14.8*  14.5  14.3   PLT  149  188  201   MPV  9.5  8.7  8.6     Chemistry:  Recent Labs      01/26/18   0508  01/26/18   1800  01/27/18   0422   NA  152*  152*  150*   K  3.3*  4.0  4.0   CL  119*  123*  117*   CO2  21  19*  20   GLUCOSE  217*  219*  186*   BUN  56*  49*  45*   CREATININE  1.40*  1.27*  1.32*   ANIONGAP  12  10  13   LABGLOM  48*  54*  52*   GFRAA  59*  >60  >60   CALCIUM  8.3*  7.4*  8.1*     Recent Labs      01/24/18   1347   PROT  4.8*     Albumin:   Lab Results   Component Value Date    LABALBU 2.4 01/22/2018   Urine Protein:    Lab Results   Component Value Date    PROTEINU 1+ 01/24/2018         Radiology:    Kidney US:  1.  No hydronephrosis.       2.  Acoustic shadowing in the expected location of the gallbladder suggesting

## 2018-01-27 NOTE — PLAN OF CARE
Problem: Falls - Risk of  Goal: Absence of falls  Outcome: Ongoing  Total assist total lift up to chair daughter at bedside

## 2018-01-27 NOTE — PROGRESS NOTES
PROGRESS NOTE    Admit Date: 1/22/2018         Subjective: improved significantly over night respiratory rate down , osat now stable on room air ,, toleratin g dysphagia 1 diet but doing poorly with oral concern of throat pain ,       Diet: Dietary Nutrition Supplements: Frozen Oral Supplement  DIET DYSPHAGIA I PUREED; Carb Control: 4 carbs/meal (approximate 1800 kcals/day); Dysphagia I Pureed; Nectar Thick  Pain is: Moderate  Nausea:None  Bowel Movement/Flatus yes    Data:   Scheduled Meds: Reviewed  Continuous Infusions:   dextrose 100 mL/hr at 01/26/18 1307    dextrose         Intake/Output Summary (Last 24 hours) at 01/26/18 1937  Last data filed at 01/26/18 1702   Gross per 24 hour   Intake             1211 ml   Output             1140 ml   Net               71 ml     Hematology:  Recent Labs      01/24/18   0519  01/25/18   0542  01/26/18   0848   WBC  8.4  7.2  8.8   HGB  10.7*  11.0*  10.9*   HCT  32.8*  33.8*  33.0*   PLT  172  149  188     Chemistry:  Recent Labs      01/25/18   0542  01/26/18   0508  01/26/18   1800   NA  150*  152*  152*   K  3.7  3.3*  4.0   CL  118*  119*  123*   CO2  20  21  19*   GLUCOSE  197*  217*  219*   BUN  67*  56*  49*   CREATININE  1.59*  1.40*  1.27*   ANIONGAP  12  12  10   LABGLOM  42*  48*  54*   GFRAA  51*  59*  >60   CALCIUM  8.3*  8.3*  7.4*     Recent Labs      01/24/18   1347   PROT  4.8*       -----------------------------------------------------------------  RAD: xray improving     Objective:   Vitals: /80   Pulse 83   Temp 98.6 °F (37 °C) (Infrared)   Resp 24   Ht 5' 2\" (1.575 m)   Wt 125 lb (56.7 kg)   SpO2 98%   BMI 22.86 kg/m²   General appearance: alert, appears stated age and cooperative  Skin: Skin color, texture, turgor normal.   HEENT: Head: Normocephalic, no lesions, without obvious abnormality.   Pharynx: posterior pharyngeal exudate buccal mucosal erythema white exudate   Neck: no adenopathy, no carotid bruit, no JVD, supple, symmetrical, trachea midline and thyroid not enlarged, symmetric, no tenderness/mass/nodules  Lungs: rhonchi bibasilar and bilaterally  Heart: regular rate and rhythm, S1, S2 normal, no murmur, click, rub or gallop  Abdomen: soft, non-tender; bowel sounds normal; no masses,  no organomegaly  Extremities: extremities normal, atraumatic, no cyanosis or edema  Lymphatic: No significant lymph node enlargement papable  Neurologic: Mental status: Alert, oriented, thought content appropriate      Assessment & Plan:    Patient Active Problem List:     Hypernatremia     Oral candidiasis      Pneumonia     Influenza A     Acute on chronic renal insufficiency     Parkinson disease ([facility] [address]      See orders   Disposition: dc saline   Diflucan daily x 5 day nystatin tid oral swish and swallow ,,  Dc iv steroid oral pred initiated  Will switch to oral protonix soon ,   Magic cup , tid  Thicken fluid nectar aspiration protection       Mary Ellen Grimes

## 2018-01-27 NOTE — PROGRESS NOTES
Pulmonary Critical Care Progress Note  Genesis Chakraborty MD     Patient seen for the follow up of Acute respiratory failure, aspiration pneumonia, acute exacerbation of COPD, AK I, dysphagia, influenza A, Parkinson's dementia     Subjective:  He is sleeping with periods of alertness. He ate a good breakfast this morning. He sat up in the chair. He denies chest pain, mild occasional cough, mostly dry earlier. Shortness of breath is better. Examination:  Vitals: BP (!) 141/59   Pulse 79   Temp 98.4 °F (36.9 °C) (Oral)   Resp 28   Ht 5' 2\" (1.575 m)   Wt 125 lb (56.7 kg)   SpO2 91%   BMI 22.86 kg/m²   General appearance: Sleeping with periods of alertness and cooperative with exam  Neck: No JVD  Lungs: clear to auscultation bilaterally and diminished breath sounds bilaterally  Heart: regular rate and rhythm, S1, S2 normal, no gallop  Abdomen: Soft, non tender, + BS  Extremities: no cyanosis or clubbing. No significant edema    LABs:  CBC:   Recent Labs      01/26/18   0848  01/27/18   0422   WBC  8.8  10.9   HGB  10.9*  10.2*   HCT  33.0*  31.1*   PLT  188  201     BMP:   Recent Labs      01/26/18   1800  01/27/18   0422  01/27/18   1128   NA  152*  150*  148*   K  4.0  4.0   --    CO2  19*  20   --    BUN  49*  45*   --    CREATININE  1.27*  1.32*   --    LABGLOM  54*  52*   --    GLUCOSE  219*  186*   --      Radiology:  1/27/18      Impression:  · Acute hypoxic respiratory failure, resolved  · Acute exacerbation of COPD  · Aspiration Pneumonia  · Influenza A  · Dysphagia  · LILLIAN/CKD  · Parkinson's dementia  · Hypotension,? Secondary to Precedex, resolved    Recommendations:  · Watch blood pressure off of Dilan-Synephrine  · Continue IV antibiotics, Zithromax/Zosyn  · Prednisone taper  · Xopenex and Ipratropium Q 4 hours and prn  · Patient's calorie intake is not enough, not sure patient can be encouraged to eat enough.  ? Tube feeds  · X-ray chest in am  · Continue IV fluids  · Watch sodium and renal

## 2018-01-28 ENCOUNTER — APPOINTMENT (OUTPATIENT)
Dept: CT IMAGING | Age: 83
DRG: 193 | End: 2018-01-28
Payer: MEDICARE

## 2018-01-28 ENCOUNTER — APPOINTMENT (OUTPATIENT)
Dept: GENERAL RADIOLOGY | Age: 83
DRG: 193 | End: 2018-01-28
Payer: MEDICARE

## 2018-01-28 PROBLEM — I10 ESSENTIAL HYPERTENSION: Status: ACTIVE | Noted: 2018-01-28

## 2018-01-28 PROBLEM — R09.02 HYPOXIA: Status: ACTIVE | Noted: 2018-01-28

## 2018-01-28 PROBLEM — J69.0 ASPIRATION PNEUMONIA (HCC): Status: ACTIVE | Noted: 2018-01-22

## 2018-01-28 PROBLEM — E87.0 ACUTE HYPERNATREMIA: Status: ACTIVE | Noted: 2018-01-28

## 2018-01-28 PROBLEM — S32.010A CLOSED COMPRESSION FRACTURE OF L1 LUMBAR VERTEBRA: Chronic | Status: ACTIVE | Noted: 2018-01-28

## 2018-01-28 PROBLEM — R06.03 ACUTE RESPIRATORY DISTRESS: Status: ACTIVE | Noted: 2018-01-28

## 2018-01-28 PROBLEM — E87.6 HYPOKALEMIA: Status: ACTIVE | Noted: 2018-01-28

## 2018-01-28 PROBLEM — I67.9 CEREBROVASCULAR DISEASE: Chronic | Status: ACTIVE | Noted: 2018-01-28

## 2018-01-28 LAB
ABSOLUTE EOS #: 0 K/UL (ref 0–0.4)
ABSOLUTE IMMATURE GRANULOCYTE: ABNORMAL K/UL (ref 0–0.3)
ABSOLUTE LYMPH #: 0.6 K/UL (ref 1–4.8)
ABSOLUTE MONO #: 0.3 K/UL (ref 0.2–0.8)
ALBUMIN SERPL-MCNC: 2 G/DL (ref 3.5–5.2)
ALBUMIN/GLOBULIN RATIO: ABNORMAL (ref 1–2.5)
ALP BLD-CCNC: 61 U/L (ref 40–129)
ALT SERPL-CCNC: 31 U/L (ref 5–41)
ANION GAP SERPL CALCULATED.3IONS-SCNC: 11 MMOL/L (ref 9–17)
ANION GAP SERPL CALCULATED.3IONS-SCNC: 12 MMOL/L (ref 9–17)
AST SERPL-CCNC: 20 U/L
BASOPHILS # BLD: 0 % (ref 0–2)
BASOPHILS ABSOLUTE: 0 K/UL (ref 0–0.2)
BILIRUB SERPL-MCNC: 0.37 MG/DL (ref 0.3–1.2)
BILIRUBIN DIRECT: 0.13 MG/DL
BILIRUBIN, INDIRECT: 0.24 MG/DL (ref 0–1)
BUN BLDV-MCNC: 33 MG/DL (ref 8–23)
BUN BLDV-MCNC: 33 MG/DL (ref 8–23)
BUN/CREAT BLD: 27 (ref 9–20)
CALCIUM SERPL-MCNC: 7.5 MG/DL (ref 8.6–10.4)
CALCIUM SERPL-MCNC: 7.8 MG/DL (ref 8.6–10.4)
CHLORIDE BLD-SCNC: 109 MMOL/L (ref 98–107)
CHLORIDE BLD-SCNC: 111 MMOL/L (ref 98–107)
CO2: 22 MMOL/L (ref 20–31)
CO2: 24 MMOL/L (ref 20–31)
CREAT SERPL-MCNC: 1.21 MG/DL (ref 0.7–1.2)
CREAT SERPL-MCNC: 1.32 MG/DL (ref 0.7–1.2)
CULTURE: NORMAL
DIFFERENTIAL TYPE: ABNORMAL
EOSINOPHILS RELATIVE PERCENT: 0 % (ref 1–4)
GFR AFRICAN AMERICAN: >60 ML/MIN
GFR AFRICAN AMERICAN: >60 ML/MIN
GFR NON-AFRICAN AMERICAN: 52 ML/MIN
GFR NON-AFRICAN AMERICAN: 57 ML/MIN
GFR SERPL CREATININE-BSD FRML MDRD: ABNORMAL ML/MIN/{1.73_M2}
GLUCOSE BLD-MCNC: 126 MG/DL (ref 70–99)
GLUCOSE BLD-MCNC: 156 MG/DL (ref 75–110)
GLUCOSE BLD-MCNC: 172 MG/DL (ref 75–110)
GLUCOSE BLD-MCNC: 245 MG/DL (ref 70–99)
HCT VFR BLD CALC: 29.5 % (ref 41–53)
HEMOGLOBIN: 9.6 G/DL (ref 13.5–17.5)
IMMATURE GRANULOCYTES: ABNORMAL %
LIPASE: 53 U/L (ref 13–60)
LYMPHOCYTES # BLD: 5 % (ref 24–44)
Lab: NORMAL
Lab: NORMAL
MAGNESIUM: 1.8 MG/DL (ref 1.6–2.6)
MAGNESIUM: 2 MG/DL (ref 1.6–2.6)
MCH RBC QN AUTO: 29.7 PG (ref 26–34)
MCHC RBC AUTO-ENTMCNC: 32.4 G/DL (ref 31–37)
MCV RBC AUTO: 91.7 FL (ref 80–100)
MONOCYTES # BLD: 2 % (ref 1–7)
NRBC AUTOMATED: ABNORMAL PER 100 WBC
PDW BLD-RTO: 14.3 % (ref 11.5–14.5)
PHOSPHORUS: 2.8 MG/DL (ref 2.5–4.5)
PLATELET # BLD: 214 K/UL (ref 130–400)
PLATELET ESTIMATE: ABNORMAL
PMV BLD AUTO: 8.2 FL (ref 6–12)
POTASSIUM SERPL-SCNC: 3.4 MMOL/L (ref 3.7–5.3)
POTASSIUM SERPL-SCNC: 4.5 MMOL/L (ref 3.7–5.3)
RBC # BLD: 3.22 M/UL (ref 4.5–5.9)
RBC # BLD: ABNORMAL 10*6/UL
SEG NEUTROPHILS: 93 % (ref 36–66)
SEGMENTED NEUTROPHILS ABSOLUTE COUNT: 12.7 K/UL (ref 1.8–7.7)
SODIUM BLD-SCNC: 143 MMOL/L (ref 135–144)
SODIUM BLD-SCNC: 146 MMOL/L (ref 135–144)
SPECIMEN DESCRIPTION: NORMAL
STATUS: NORMAL
STATUS: NORMAL
TOTAL PROTEIN: 4.8 G/DL (ref 6.4–8.3)
WBC # BLD: 13.6 K/UL (ref 3.5–11)
WBC # BLD: ABNORMAL 10*3/UL

## 2018-01-28 PROCEDURE — 2500000003 HC RX 250 WO HCPCS: Performed by: INTERNAL MEDICINE

## 2018-01-28 PROCEDURE — 2700000000 HC OXYGEN THERAPY PER DAY

## 2018-01-28 PROCEDURE — C9113 INJ PANTOPRAZOLE SODIUM, VIA: HCPCS | Performed by: FAMILY MEDICINE

## 2018-01-28 PROCEDURE — 6370000000 HC RX 637 (ALT 250 FOR IP): Performed by: FAMILY MEDICINE

## 2018-01-28 PROCEDURE — 6370000000 HC RX 637 (ALT 250 FOR IP): Performed by: INTERNAL MEDICINE

## 2018-01-28 PROCEDURE — 82947 ASSAY GLUCOSE BLOOD QUANT: CPT

## 2018-01-28 PROCEDURE — 94640 AIRWAY INHALATION TREATMENT: CPT

## 2018-01-28 PROCEDURE — 94761 N-INVAS EAR/PLS OXIMETRY MLT: CPT

## 2018-01-28 PROCEDURE — 83735 ASSAY OF MAGNESIUM: CPT

## 2018-01-28 PROCEDURE — 84100 ASSAY OF PHOSPHORUS: CPT

## 2018-01-28 PROCEDURE — 80053 COMPREHEN METABOLIC PANEL: CPT

## 2018-01-28 PROCEDURE — 6360000002 HC RX W HCPCS: Performed by: FAMILY MEDICINE

## 2018-01-28 PROCEDURE — 6360000002 HC RX W HCPCS: Performed by: INTERNAL MEDICINE

## 2018-01-28 PROCEDURE — 82248 BILIRUBIN DIRECT: CPT

## 2018-01-28 PROCEDURE — 74176 CT ABD & PELVIS W/O CONTRAST: CPT

## 2018-01-28 PROCEDURE — 80048 BASIC METABOLIC PNL TOTAL CA: CPT

## 2018-01-28 PROCEDURE — 85025 COMPLETE CBC W/AUTO DIFF WBC: CPT

## 2018-01-28 PROCEDURE — 2060000000 HC ICU INTERMEDIATE R&B

## 2018-01-28 PROCEDURE — 2580000003 HC RX 258: Performed by: FAMILY MEDICINE

## 2018-01-28 PROCEDURE — 94660 CPAP INITIATION&MGMT: CPT

## 2018-01-28 PROCEDURE — 71045 X-RAY EXAM CHEST 1 VIEW: CPT

## 2018-01-28 PROCEDURE — 36592 COLLECT BLOOD FROM PICC: CPT

## 2018-01-28 PROCEDURE — 71250 CT THORAX DX C-: CPT

## 2018-01-28 PROCEDURE — 83690 ASSAY OF LIPASE: CPT

## 2018-01-28 RX ORDER — POTASSIUM CHLORIDE 20 MEQ/1
20 TABLET, EXTENDED RELEASE ORAL ONCE
Status: DISCONTINUED | OUTPATIENT
Start: 2018-01-28 | End: 2018-02-05 | Stop reason: HOSPADM

## 2018-01-28 RX ORDER — PREDNISONE 10 MG/1
10 TABLET ORAL 2 TIMES DAILY
Status: DISCONTINUED | OUTPATIENT
Start: 2018-01-29 | End: 2018-01-31

## 2018-01-28 RX ORDER — METOCLOPRAMIDE HYDROCHLORIDE 5 MG/ML
5 INJECTION INTRAMUSCULAR; INTRAVENOUS
Status: DISPENSED | OUTPATIENT
Start: 2018-01-28 | End: 2018-01-31

## 2018-01-28 RX ORDER — BISACODYL 10 MG
10 SUPPOSITORY, RECTAL RECTAL DAILY PRN
Status: DISCONTINUED | OUTPATIENT
Start: 2018-01-28 | End: 2018-02-05 | Stop reason: HOSPADM

## 2018-01-28 RX ORDER — POLYETHYLENE GLYCOL 3350 17 G/17G
17 POWDER, FOR SOLUTION ORAL DAILY
Status: DISCONTINUED | OUTPATIENT
Start: 2018-01-29 | End: 2018-02-02

## 2018-01-28 RX ORDER — MORPHINE SULFATE 2 MG/ML
1 INJECTION, SOLUTION INTRAMUSCULAR; INTRAVENOUS ONCE
Status: COMPLETED | OUTPATIENT
Start: 2018-01-28 | End: 2018-01-28

## 2018-01-28 RX ORDER — FUROSEMIDE 10 MG/ML
20 INJECTION INTRAMUSCULAR; INTRAVENOUS ONCE
Status: COMPLETED | OUTPATIENT
Start: 2018-01-28 | End: 2018-01-28

## 2018-01-28 RX ORDER — POTASSIUM CHLORIDE 7.45 MG/ML
10 INJECTION INTRAVENOUS PRN
Status: DISCONTINUED | OUTPATIENT
Start: 2018-01-28 | End: 2018-02-05 | Stop reason: HOSPADM

## 2018-01-28 RX ADMIN — LEVALBUTEROL HYDROCHLORIDE 1.25 MG: 1.25 SOLUTION, CONCENTRATE RESPIRATORY (INHALATION) at 19:13

## 2018-01-28 RX ADMIN — Medication 3.38 G: at 21:45

## 2018-01-28 RX ADMIN — CARBIDOPA AND LEVODOPA 1 TABLET: 25; 100 TABLET ORAL at 21:44

## 2018-01-28 RX ADMIN — Medication 10 ML: at 21:47

## 2018-01-28 RX ADMIN — IPRATROPIUM BROMIDE 0.5 MG: 0.5 SOLUTION RESPIRATORY (INHALATION) at 19:13

## 2018-01-28 RX ADMIN — DONEPEZIL HYDROCHLORIDE 10 MG: 10 TABLET, FILM COATED ORAL at 21:44

## 2018-01-28 RX ADMIN — POTASSIUM CHLORIDE 10 MEQ: 7.46 INJECTION, SOLUTION INTRAVENOUS at 16:16

## 2018-01-28 RX ADMIN — IPRATROPIUM BROMIDE 0.5 MG: 0.5 SOLUTION RESPIRATORY (INHALATION) at 12:29

## 2018-01-28 RX ADMIN — MORPHINE SULFATE 1 MG: 2 INJECTION, SOLUTION INTRAMUSCULAR; INTRAVENOUS at 00:31

## 2018-01-28 RX ADMIN — ACETAMINOPHEN 650 MG: 325 SOLUTION ORAL at 12:57

## 2018-01-28 RX ADMIN — NYSTATIN 500000 UNITS: 100000 SUSPENSION ORAL at 21:44

## 2018-01-28 RX ADMIN — CARBIDOPA AND LEVODOPA 1 TABLET: 25; 100 TABLET ORAL at 16:20

## 2018-01-28 RX ADMIN — Medication 3.38 G: at 05:34

## 2018-01-28 RX ADMIN — INSULIN LISPRO 1 UNITS: 100 INJECTION, SOLUTION INTRAVENOUS; SUBCUTANEOUS at 21:47

## 2018-01-28 RX ADMIN — IPRATROPIUM BROMIDE 0.5 MG: 0.5 SOLUTION RESPIRATORY (INHALATION) at 15:31

## 2018-01-28 RX ADMIN — LEVALBUTEROL HYDROCHLORIDE 1.25 MG: 1.25 SOLUTION, CONCENTRATE RESPIRATORY (INHALATION) at 15:30

## 2018-01-28 RX ADMIN — PREDNISONE 20 MG: 20 TABLET ORAL at 09:24

## 2018-01-28 RX ADMIN — LEVALBUTEROL HYDROCHLORIDE 1.25 MG: 1.25 SOLUTION, CONCENTRATE RESPIRATORY (INHALATION) at 12:29

## 2018-01-28 RX ADMIN — PANTOPRAZOLE SODIUM 40 MG: 40 INJECTION, POWDER, FOR SOLUTION INTRAVENOUS at 21:44

## 2018-01-28 RX ADMIN — NYSTATIN 500000 UNITS: 100000 SUSPENSION ORAL at 15:02

## 2018-01-28 RX ADMIN — LEVALBUTEROL HYDROCHLORIDE 1.25 MG: 1.25 SOLUTION, CONCENTRATE RESPIRATORY (INHALATION) at 03:04

## 2018-01-28 RX ADMIN — FUROSEMIDE 20 MG: 10 INJECTION, SOLUTION INTRAMUSCULAR; INTRAVENOUS at 00:31

## 2018-01-28 RX ADMIN — ACETYLCYSTEINE 800 MG: 200 SOLUTION ORAL; RESPIRATORY (INHALATION) at 07:58

## 2018-01-28 RX ADMIN — LEVALBUTEROL HYDROCHLORIDE 1.25 MG: 1.25 SOLUTION, CONCENTRATE RESPIRATORY (INHALATION) at 07:57

## 2018-01-28 RX ADMIN — FLUCONAZOLE 100 MG: 2 INJECTION INTRAVENOUS at 20:57

## 2018-01-28 RX ADMIN — AMLODIPINE BESYLATE 5 MG: 5 TABLET ORAL at 21:44

## 2018-01-28 RX ADMIN — AMLODIPINE BESYLATE 5 MG: 5 TABLET ORAL at 09:39

## 2018-01-28 RX ADMIN — Medication 3.38 G: at 13:08

## 2018-01-28 RX ADMIN — INSULIN LISPRO 2 UNITS: 100 INJECTION, SOLUTION INTRAVENOUS; SUBCUTANEOUS at 17:29

## 2018-01-28 RX ADMIN — AZITHROMYCIN MONOHYDRATE 500 MG: 500 INJECTION, POWDER, LYOPHILIZED, FOR SOLUTION INTRAVENOUS at 16:28

## 2018-01-28 RX ADMIN — NYSTATIN 500000 UNITS: 100000 SUSPENSION ORAL at 09:40

## 2018-01-28 RX ADMIN — POTASSIUM CHLORIDE 10 MEQ: 7.46 INJECTION, SOLUTION INTRAVENOUS at 12:51

## 2018-01-28 RX ADMIN — Medication 10 ML: at 21:44

## 2018-01-28 RX ADMIN — POTASSIUM CHLORIDE 10 MEQ: 7.46 INJECTION, SOLUTION INTRAVENOUS at 11:42

## 2018-01-28 RX ADMIN — TAMSULOSIN HYDROCHLORIDE 0.4 MG: 0.4 CAPSULE ORAL at 17:29

## 2018-01-28 RX ADMIN — METOCLOPRAMIDE 5 MG: 5 INJECTION, SOLUTION INTRAMUSCULAR; INTRAVENOUS at 16:28

## 2018-01-28 RX ADMIN — PREDNISONE 20 MG: 20 TABLET ORAL at 16:20

## 2018-01-28 RX ADMIN — METOCLOPRAMIDE 5 MG: 5 INJECTION, SOLUTION INTRAMUSCULAR; INTRAVENOUS at 21:45

## 2018-01-28 RX ADMIN — CARBIDOPA AND LEVODOPA 1 TABLET: 25; 100 TABLET ORAL at 09:23

## 2018-01-28 RX ADMIN — IPRATROPIUM BROMIDE 0.5 MG: 0.5 SOLUTION RESPIRATORY (INHALATION) at 07:57

## 2018-01-28 ASSESSMENT — PAIN SCALES - GENERAL
PAINLEVEL_OUTOF10: 0
PAINLEVEL_OUTOF10: 0
PAINLEVEL_OUTOF10: 6

## 2018-01-28 ASSESSMENT — PAIN DESCRIPTION - PAIN TYPE: TYPE: ACUTE PAIN

## 2018-01-28 ASSESSMENT — PAIN DESCRIPTION - LOCATION: LOCATION: GENERALIZED

## 2018-01-28 ASSESSMENT — PAIN SCALES - WONG BAKER: WONGBAKER_NUMERICALRESPONSE: 0

## 2018-01-28 NOTE — PLAN OF CARE
Problem: Falls - Risk of  Goal: Absence of falls  Outcome: Ongoing  Siderails up x 2  Hourly rounding. Call light in reach. Instructed to call for assist before attempting out of bed. Remains free from falls and accidental injury at this time. Floor free from obstacles, and bed is locked and in lowest position. Adequate lighting provided. Bed alarm on. Tele sitter in place for pt pulling at lines    Problem: Risk for Impaired Skin Integrity  Goal: Tissue integrity - skin and mucous membranes  Structural intactness and normal physiological function of skin and  mucous membranes.    Outcome: Ongoing  Waffle mattress on bed and properly inflated   Q2 turn  Meiplex to coccyx   Powder to groin       Problem: Pain:  Goal: Pain level will decrease  Pain level will decrease   Outcome: Ongoing    Goal: Control of acute pain  Control of acute pain   Outcome: Ongoing    Goal: Control of chronic pain  Control of chronic pain   Outcome: Ongoing      Problem: Discharge Planning:  Goal: Discharged to appropriate level of care  Discharged to appropriate level of care  Outcome: Ongoing      Problem: Airway Clearance - Ineffective:  Goal: Clear lung sounds  Clear lung sounds  Outcome: Ongoing    Goal: Ability to maintain a clear airway will improve  Ability to maintain a clear airway will improve  Outcome: Ongoing      Problem: Fluid Volume - Deficit:  Goal: Achieves intake and output within specified parameters  Achieves intake and output within specified parameters  Outcome: Ongoing      Problem: Gas Exchange - Impaired:  Goal: Levels of oxygenation will improve  Levels of oxygenation will improve  Outcome: Ongoing

## 2018-01-28 NOTE — PROGRESS NOTES
hours.    -----------------------------------------------------------------  RAD: as above    Objective:   Vitals: BP (!) 111/45   Pulse 81   Temp 99.6 °F (37.6 °C) (Axillary)   Resp (!) 32   Ht 5' 2\" (1.575 m)   Wt 125 lb (56.7 kg)   SpO2 91%   BMI 22.86 kg/m²   General appearance: alert, appears stated age and cooperative  Skin: Skin color, texture, turgor normal.   HEENT: Pharynx: thrush improved   Neck: no adenopathy, no carotid bruit, no JVD, supple, symmetrical, trachea midline and thyroid not enlarged, symmetric, no tenderness/mass/nodules  Lungs: upper airway congestion ,some post rhonchi improved bs at base  Heart: regular rate and rhythm, S1, S2 normal, no murmur, click, rub or gallop  Abdomen: soft, non-tender; bowel sounds normal; no masses,  no organomegaly  Extremities: extremities normal, atraumatic, no cyanosis or edema  Lymphatic: No significant lymph node enlargement papable  Neurologic: Mental status: Alert, oriented, thought content appropriate      Assessment & Plan:    Patient Active Problem List:     Acute resp distress requiring bipap , improving       Constipation , aerophagia and distension        Gastroparesis        Hypokalemia        Hyponatremia resolved      Pneumonia     Influenza A     Acute on chronic renal insufficiency     Parkinson disease (HCC)      Gi bleeding , upper        Anemia   See orders   Discussed with dr Alec Harrison ,neurology short term use of reglan ac    miralax to start in am    addendum        ---dulcolax given wth some response of stool   Disposition:    Poonam Sullivan

## 2018-01-28 NOTE — PROGRESS NOTES
normal.   HEENT: Pharynx: Tonsils / Pharynx: erythematous   Neck: no adenopathy, no carotid bruit, no JVD, supple, symmetrical, trachea midline and thyroid not enlarged, symmetric, no tenderness/mass/nodules  Lungs: diminished breath sounds bibasilar and rhonchi anterior - bilateral and posterior - right  Heart: regular rate and rhythm, S1, S2 normal, no murmur, click, rub or gallop  Abdomen: soft, non-tender; bowel sounds normal; no masses,  no organomegaly  Extremities: extremities normal, atraumatic, no cyanosis or edema  Lymphatic: No significant lymph node enlargement papable  Neurologic: Mental status: Alert, oriented, thought content appropriate      Assessment & Plan:    Patient Active Problem List:     Pneumonia     Influenza A     Acute on chronic renal insufficiency     Parkinson disease ([facility] [address]      Acute respiratory insufficiency with resp failure resolved ,      Hypernatremia   Anemia   Gi bleeding   Dysphagia   Profound weakness  See orders   Disposition:  Continue pt ot    epc cuffs   aspiration precautions   hopefully home with home health if transferring with less assist   dc zithromax after 5th dose    oral hygeine    skin care reviewed with staff    [facility]

## 2018-01-28 NOTE — PROGRESS NOTES
Pulmonary Critical Care Progress Note  Narciso Edgar MD     Patient seen for the follow up of Acute respiratory failure, aspiration pneumonia, acute exacerbation of COPD, AK I, dysphagia, influenza A, Parkinson's dementia     Subjective:  He is sleeping with periods of alertness. After laxative he had a bowel movement. He denies chest pain, mild occasional cough, mostly dry earlier. Shortness of breath is better. He did not get out of the bed today. Patient's family present at the bedside. Examination:  Vitals: BP (!) 111/45   Pulse 81   Temp 99.6 °F (37.6 °C) (Axillary)   Resp (!) 32   Ht 5' 2\" (1.575 m)   Wt 125 lb (56.7 kg)   SpO2 91%   BMI 22.86 kg/m²   General appearance: Sleeping with periods of alertness and cooperative with exam  Neck: No JVD  Lungs: clear to auscultation bilaterally and diminished breath sounds bilaterally  Heart: regular rate and rhythm, S1, S2 normal, no gallop  Abdomen: Soft, non tender, + BS  Extremities: no cyanosis or clubbing. No significant edema    LABs:  CBC:   Recent Labs      01/27/18   0422  01/28/18   0551   WBC  10.9  13.6*   HGB  10.2*  9.6*   HCT  31.1*  29.5*   PLT  201  214     BMP:   Recent Labs      01/27/18   0422  01/27/18   1128  01/28/18   0551   NA  150*  148*  146*   K  4.0   --   3.4*   CO2  20   --   24   BUN  45*   --   33*   CREATININE  1.32*   --   1.21*   LABGLOM  52*   --   57*   GLUCOSE  186*   --   126*     Radiology:  1/27/18      Impression:  · Acute hypoxic respiratory failure, resolved  · Acute exacerbation of COPD  · Aspiration Pneumonia  · Influenza A  · Dysphagia  · LILLIAN/CKD  · Parkinson's dementia  · Hypotension,? Secondary to Precedex, resolved  · Hypernatremia    Recommendations:  · Continue IV antibiotics, Zithromax/Zosyn  · Prednisone, decrease dose  · Xopenex and Ipratropium Q 4 hours and prn  · Patient's calorie intake is not enough, not sure patient can be encouraged to eat enough.  ? Tube feeds  · Continue IV fluids  · Watch sodium and renal function, Labs: CBC and BMP in am  · Watch off of BiPAP/high flow oxygen by nasal cannula  · DVT prophylaxis with low molecular weight heparin  · Will follow with you    Blaine Miramontes MD, [facility]  Pulmonary Critical Care and Sleep Medicine,  [facility]  Cell: [phone]  Office: [phone]

## 2018-01-28 NOTE — FLOWSHEET NOTE
01/28/18 0005   Vital Signs   Temp 98.1 °F (36.7 °C)   Temp Source Axillary   Pulse 96   Heart Rate Source Monitor   Resp (!) 48   BP (!) 149/60   BP Location Right Arm   BP Upper/Lower Upper   MAP (mmHg) 82   Oxygen Therapy   SpO2 99 %    paged for update on pt status and new orders. Pt's respiratory rate had increased, (see above) and well as rhonchi had increased bilaterally. Pt was placed on bipap. Pt also increased in agitation   New orders for 1x dose of 20mg IV lasix, 1 mg of IV morphine. One time does of 0.5mg ativan IV, PRN, High flow NC @ 6L PRN if above orders are insufficient.      Will continue to monitor patient

## 2018-01-29 LAB
ABSOLUTE EOS #: 0 K/UL (ref 0–0.4)
ABSOLUTE IMMATURE GRANULOCYTE: ABNORMAL K/UL (ref 0–0.3)
ABSOLUTE LYMPH #: 0.6 K/UL (ref 1–4.8)
ABSOLUTE MONO #: 0.2 K/UL (ref 0.2–0.8)
ANION GAP SERPL CALCULATED.3IONS-SCNC: 10 MMOL/L (ref 9–17)
ANION GAP SERPL CALCULATED.3IONS-SCNC: 11 MMOL/L (ref 9–17)
BASOPHILS # BLD: 0 % (ref 0–2)
BASOPHILS ABSOLUTE: 0 K/UL (ref 0–0.2)
BUN BLDV-MCNC: 30 MG/DL (ref 8–23)
BUN BLDV-MCNC: 31 MG/DL (ref 8–23)
BUN/CREAT BLD: 28 (ref 9–20)
BUN/CREAT BLD: 28 (ref 9–20)
CALCIUM SERPL-MCNC: 7.5 MG/DL (ref 8.6–10.4)
CALCIUM SERPL-MCNC: 7.5 MG/DL (ref 8.6–10.4)
CHLORIDE BLD-SCNC: 108 MMOL/L (ref 98–107)
CHLORIDE BLD-SCNC: 109 MMOL/L (ref 98–107)
CO2: 23 MMOL/L (ref 20–31)
CO2: 24 MMOL/L (ref 20–31)
CREAT SERPL-MCNC: 1.07 MG/DL (ref 0.7–1.2)
CREAT SERPL-MCNC: 1.1 MG/DL (ref 0.7–1.2)
DIFFERENTIAL TYPE: ABNORMAL
EOSINOPHILS RELATIVE PERCENT: 0 % (ref 1–4)
GFR AFRICAN AMERICAN: >60 ML/MIN
GFR AFRICAN AMERICAN: >60 ML/MIN
GFR NON-AFRICAN AMERICAN: >60 ML/MIN
GFR NON-AFRICAN AMERICAN: >60 ML/MIN
GFR SERPL CREATININE-BSD FRML MDRD: ABNORMAL ML/MIN/{1.73_M2}
GLUCOSE BLD-MCNC: 112 MG/DL (ref 75–110)
GLUCOSE BLD-MCNC: 121 MG/DL (ref 70–99)
GLUCOSE BLD-MCNC: 142 MG/DL (ref 75–110)
GLUCOSE BLD-MCNC: 154 MG/DL (ref 70–99)
GLUCOSE BLD-MCNC: 164 MG/DL (ref 75–110)
GLUCOSE BLD-MCNC: 92 MG/DL (ref 75–110)
HCT VFR BLD CALC: 28.2 % (ref 41–53)
HEMOGLOBIN: 9.2 G/DL (ref 13.5–17.5)
IMMATURE GRANULOCYTES: ABNORMAL %
LYMPHOCYTES # BLD: 4 % (ref 24–44)
MCH RBC QN AUTO: 29.9 PG (ref 26–34)
MCHC RBC AUTO-ENTMCNC: 32.6 G/DL (ref 31–37)
MCV RBC AUTO: 91.7 FL (ref 80–100)
MONOCYTES # BLD: 1 % (ref 1–7)
NRBC AUTOMATED: ABNORMAL PER 100 WBC
PDW BLD-RTO: 14.1 % (ref 11.5–14.5)
PLATELET # BLD: 194 K/UL (ref 130–400)
PLATELET ESTIMATE: ABNORMAL
PMV BLD AUTO: 8.4 FL (ref 6–12)
POTASSIUM SERPL-SCNC: 3.9 MMOL/L (ref 3.7–5.3)
POTASSIUM SERPL-SCNC: 3.9 MMOL/L (ref 3.7–5.3)
PROCALCITONIN: 0.34 NG/ML
RBC # BLD: 3.07 M/UL (ref 4.5–5.9)
RBC # BLD: ABNORMAL 10*6/UL
SEG NEUTROPHILS: 95 % (ref 36–66)
SEGMENTED NEUTROPHILS ABSOLUTE COUNT: 12.5 K/UL (ref 1.8–7.7)
SODIUM BLD-SCNC: 141 MMOL/L (ref 135–144)
SODIUM BLD-SCNC: 144 MMOL/L (ref 135–144)
WBC # BLD: 13.3 K/UL (ref 3.5–11)
WBC # BLD: ABNORMAL 10*3/UL

## 2018-01-29 PROCEDURE — 6370000000 HC RX 637 (ALT 250 FOR IP): Performed by: FAMILY MEDICINE

## 2018-01-29 PROCEDURE — 97530 THERAPEUTIC ACTIVITIES: CPT

## 2018-01-29 PROCEDURE — C9113 INJ PANTOPRAZOLE SODIUM, VIA: HCPCS | Performed by: FAMILY MEDICINE

## 2018-01-29 PROCEDURE — 80048 BASIC METABOLIC PNL TOTAL CA: CPT

## 2018-01-29 PROCEDURE — 92526 ORAL FUNCTION THERAPY: CPT

## 2018-01-29 PROCEDURE — 2580000003 HC RX 258: Performed by: FAMILY MEDICINE

## 2018-01-29 PROCEDURE — 94640 AIRWAY INHALATION TREATMENT: CPT

## 2018-01-29 PROCEDURE — 2700000000 HC OXYGEN THERAPY PER DAY

## 2018-01-29 PROCEDURE — 6370000000 HC RX 637 (ALT 250 FOR IP): Performed by: INTERNAL MEDICINE

## 2018-01-29 PROCEDURE — 2060000000 HC ICU INTERMEDIATE R&B

## 2018-01-29 PROCEDURE — 6360000002 HC RX W HCPCS: Performed by: FAMILY MEDICINE

## 2018-01-29 PROCEDURE — 36592 COLLECT BLOOD FROM PICC: CPT

## 2018-01-29 PROCEDURE — 82947 ASSAY GLUCOSE BLOOD QUANT: CPT

## 2018-01-29 PROCEDURE — 94761 N-INVAS EAR/PLS OXIMETRY MLT: CPT

## 2018-01-29 PROCEDURE — 97110 THERAPEUTIC EXERCISES: CPT

## 2018-01-29 PROCEDURE — 84145 PROCALCITONIN (PCT): CPT

## 2018-01-29 PROCEDURE — 2500000003 HC RX 250 WO HCPCS: Performed by: INTERNAL MEDICINE

## 2018-01-29 PROCEDURE — 85025 COMPLETE CBC W/AUTO DIFF WBC: CPT

## 2018-01-29 PROCEDURE — 6360000002 HC RX W HCPCS: Performed by: INTERNAL MEDICINE

## 2018-01-29 RX ORDER — LEVALBUTEROL 1.25 MG/.5ML
1.25 SOLUTION, CONCENTRATE RESPIRATORY (INHALATION) EVERY 4 HOURS PRN
Status: DISCONTINUED | OUTPATIENT
Start: 2018-01-29 | End: 2018-02-05 | Stop reason: HOSPADM

## 2018-01-29 RX ORDER — LEVALBUTEROL 1.25 MG/.5ML
1.25 SOLUTION, CONCENTRATE RESPIRATORY (INHALATION) 4 TIMES DAILY
Status: DISCONTINUED | OUTPATIENT
Start: 2018-01-30 | End: 2018-02-05 | Stop reason: HOSPADM

## 2018-01-29 RX ADMIN — PREDNISONE 10 MG: 10 TABLET ORAL at 13:43

## 2018-01-29 RX ADMIN — LEVALBUTEROL HYDROCHLORIDE 1.25 MG: 1.25 SOLUTION, CONCENTRATE RESPIRATORY (INHALATION) at 07:32

## 2018-01-29 RX ADMIN — LEVALBUTEROL HYDROCHLORIDE 1.25 MG: 1.25 SOLUTION, CONCENTRATE RESPIRATORY (INHALATION) at 19:57

## 2018-01-29 RX ADMIN — FLUCONAZOLE 100 MG: 2 INJECTION INTRAVENOUS at 20:08

## 2018-01-29 RX ADMIN — Medication 10 ML: at 21:09

## 2018-01-29 RX ADMIN — NYSTATIN 500000 UNITS: 100000 SUSPENSION ORAL at 08:59

## 2018-01-29 RX ADMIN — CARBIDOPA AND LEVODOPA 1 TABLET: 25; 100 TABLET ORAL at 08:59

## 2018-01-29 RX ADMIN — METOCLOPRAMIDE 5 MG: 5 INJECTION, SOLUTION INTRAMUSCULAR; INTRAVENOUS at 05:34

## 2018-01-29 RX ADMIN — METOCLOPRAMIDE 5 MG: 5 INJECTION, SOLUTION INTRAMUSCULAR; INTRAVENOUS at 21:08

## 2018-01-29 RX ADMIN — AMLODIPINE BESYLATE 5 MG: 5 TABLET ORAL at 21:07

## 2018-01-29 RX ADMIN — ACETYLCYSTEINE 600 MG: 200 SOLUTION ORAL; RESPIRATORY (INHALATION) at 07:32

## 2018-01-29 RX ADMIN — METOCLOPRAMIDE 5 MG: 5 INJECTION, SOLUTION INTRAMUSCULAR; INTRAVENOUS at 17:22

## 2018-01-29 RX ADMIN — METOCLOPRAMIDE 5 MG: 5 INJECTION, SOLUTION INTRAMUSCULAR; INTRAVENOUS at 11:24

## 2018-01-29 RX ADMIN — Medication 3.38 G: at 23:50

## 2018-01-29 RX ADMIN — CARBIDOPA AND LEVODOPA 1 TABLET: 25; 100 TABLET ORAL at 13:43

## 2018-01-29 RX ADMIN — TAMSULOSIN HYDROCHLORIDE 0.4 MG: 0.4 CAPSULE ORAL at 17:22

## 2018-01-29 RX ADMIN — CARBIDOPA AND LEVODOPA 1 TABLET: 25; 100 TABLET ORAL at 21:07

## 2018-01-29 RX ADMIN — DONEPEZIL HYDROCHLORIDE 10 MG: 10 TABLET, FILM COATED ORAL at 21:07

## 2018-01-29 RX ADMIN — LEVALBUTEROL HYDROCHLORIDE 1.25 MG: 1.25 SOLUTION, CONCENTRATE RESPIRATORY (INHALATION) at 15:48

## 2018-01-29 RX ADMIN — PANTOPRAZOLE SODIUM 40 MG: 40 INJECTION, POWDER, FOR SOLUTION INTRAVENOUS at 21:07

## 2018-01-29 RX ADMIN — IPRATROPIUM BROMIDE 0.5 MG: 0.5 SOLUTION RESPIRATORY (INHALATION) at 19:57

## 2018-01-29 RX ADMIN — IPRATROPIUM BROMIDE 0.5 MG: 0.5 SOLUTION RESPIRATORY (INHALATION) at 15:48

## 2018-01-29 RX ADMIN — DEXTROSE MONOHYDRATE: 50 INJECTION, SOLUTION INTRAVENOUS at 04:10

## 2018-01-29 RX ADMIN — POLYETHYLENE GLYCOL (3350) 17 G: 17 POWDER, FOR SOLUTION ORAL at 08:59

## 2018-01-29 RX ADMIN — LEVALBUTEROL HYDROCHLORIDE 1.25 MG: 1.25 SOLUTION, CONCENTRATE RESPIRATORY (INHALATION) at 00:08

## 2018-01-29 RX ADMIN — ACETYLCYSTEINE 800 MG: 200 SOLUTION ORAL; RESPIRATORY (INHALATION) at 19:56

## 2018-01-29 RX ADMIN — IPRATROPIUM BROMIDE 0.5 MG: 0.5 SOLUTION RESPIRATORY (INHALATION) at 07:32

## 2018-01-29 RX ADMIN — INSULIN LISPRO 1 UNITS: 100 INJECTION, SOLUTION INTRAVENOUS; SUBCUTANEOUS at 23:51

## 2018-01-29 RX ADMIN — LEVALBUTEROL HYDROCHLORIDE 1.25 MG: 1.25 SOLUTION, CONCENTRATE RESPIRATORY (INHALATION) at 03:23

## 2018-01-29 RX ADMIN — NYSTATIN 500000 UNITS: 100000 SUSPENSION ORAL at 21:07

## 2018-01-29 RX ADMIN — Medication 3.38 G: at 13:43

## 2018-01-29 RX ADMIN — PREDNISONE 10 MG: 10 TABLET ORAL at 08:59

## 2018-01-29 RX ADMIN — Medication 3.38 G: at 05:34

## 2018-01-29 RX ADMIN — NYSTATIN 500000 UNITS: 100000 SUSPENSION ORAL at 13:43

## 2018-01-29 RX ADMIN — ACETYLCYSTEINE 600 MG: 200 SOLUTION ORAL; RESPIRATORY (INHALATION) at 15:49

## 2018-01-29 RX ADMIN — AMLODIPINE BESYLATE 5 MG: 5 TABLET ORAL at 08:59

## 2018-01-29 ASSESSMENT — PAIN SCALES - GENERAL
PAINLEVEL_OUTOF10: 6
PAINLEVEL_OUTOF10: 6

## 2018-01-29 ASSESSMENT — PAIN DESCRIPTION - LOCATION
LOCATION: GENERALIZED
LOCATION: GENERALIZED

## 2018-01-29 NOTE — PLAN OF CARE
Problem: Falls - Risk of  Goal: Absence of falls  Outcome: Ongoing      Comments: No acute events this shift through the timing of this note. Vitals and assessment data as charted. Safety precautions remain in place. Will continue to monitor and notify if any change in this patient's condition.

## 2018-01-29 NOTE — PROGRESS NOTES
Speech Language Pathology  Speech Language Pathology  [facility]    Dysphagia Treatment Note    Date: 1/29/2018  Patients Name: Blanco Alexander  MRN: [id number]  Diagnosis: dysphagia  Patient Active Problem List   Diagnosis Code    Aspiration pneumonia ([facility] J69.0    Influenza A J10.1    Acute on chronic renal insufficiency N28.9, N18.9    Parkinson disease ([facility] G20    Acute respiratory distress R06.03    Hypoxia R09.02    Acute hypernatremia E87.0    Hypokalemia E87.6    Cerebrovascular disease I67.9    Essential hypertension I10    Closed compression fracture of L1 lumbar vertebra (HCC) S32.010A       Pain: 0/10    Dysphagia Treatment  Treatment time:8466-9295    Subjective: [] Alert [] Cooperative     [] Confused     [] Agitated    [x] Lethargic    Objective/Assessment:    Pt. Seen for O/M treatment program.  Pt. Completed O/M exercises 3 exercises X 1 set with max cues. Pt. Unable to complete sandrine maneuver with max cues. Plan:  [x] Continue ST services    [] Discharge from ST:        Discharge recommendations: [] Inpatient Rehab   [x] [facility]   [] Outpatient Therapy  [] Follow up at trauma clinic   [] Other:         Treatment completed by:  Lotus Wolf M.SMichelle [address] [facility]

## 2018-01-29 NOTE — PROGRESS NOTES
Pulmonary Critical Care Progress Note  Oskar Farooq CNP / Dr. Brady Hill M.D. Patient seen for the follow up of Acute respiratory failure, aspiration pneumonia, acute exacerbation of COPD, AK I, dysphagia, Influenza A, Parkinson's dementia     Subjective:  He awakes to verbal stimulation. He is sleepy. Unsure if he wore BiPAP last night. He has mild shortness of breath without significant cough. He denies pain. Examination:  Vitals: BP (!) 142/49   Pulse 74   Temp 98.8 °F (37.1 °C) (Oral)   Resp (!) 45   Ht 5' 2\" (1.575 m)   Wt 125 lb (56.7 kg)   SpO2 92%   BMI 22.86 kg/m²     General appearance: Quite sleepy  Neck: No JVD  Lungs: Tachypnea, + rhonchi, no wheeze  Heart: regular rate and rhythm, S1, S2 normal, no gallop  Abdomen: Soft, non tender, + BS  Extremities: no cyanosis or clubbing. No significant edema    LABs:  CBC:   Recent Labs      01/28/18   0551  01/29/18   0530   WBC  13.6*  13.3*   HGB  9.6*  9.2*   HCT  29.5*  28.2*   PLT  214  194     BMP:   Recent Labs      01/28/18   1845  01/29/18   0530   NA  143  144   K  4.5  3.9   CO2  22  24   BUN  33*  30*   CREATININE  1.32*  1.07   LABGLOM  52*  >60   GLUCOSE  245*  121*     LIVER PROFILE:  Recent Labs      01/28/18   1845   AST  20   ALT  31   LABALBU  2.0*     Radiology:      Impression:  · Acute Hypoxic respiratory failureresolved  · Acute exacerbation of COPD  · Aspiration pneumonia  · Influenza A  · Dysphasia  · LILLIAN on CKD  · Parkinson's dementia    Recommendations:  · Watch off BiPAP/high flow oxygen  · Continue IV Zithromax/Zosyn  · Prednisone taper  · Xopenex and Ipratropium Q 4 hours and prn  · ?   Tube feeding due to patient's poor oral intake/malnutrition  · IV fluids  · Labs: CBC and BMP in am, monitor renal function  · DVT prophylaxis with low molecular weight heparin  · Will follow with you    Electronically signed by     Aguila Flores CNP on 1/29/2018 at 9:22 AM  Patient was seen under the supervision of

## 2018-01-29 NOTE — FLOWSHEET NOTE
Patient resting in bed. Wife and daughter at bedside. Wife and daughter engage in brief conversaiton, sharing that \"things are about the same. \" They express no specific needs, but thank writer for visiting. Writer offers supportive conversation an listening presence. Spiritual Care will follow as needed for support.      01/29/18 1344   Encounter Summary   Services provided to: Patient and family together   Referral/Consult From: Rounding   Continue Visiting (1/29/18)   Complexity of Encounter Low   Length of Encounter 15 minutes   Routine   Type Initial   Assessment Approachable   Intervention Active listening   Outcome Engaged in conversation;Expressed gratitude

## 2018-01-29 NOTE — PROGRESS NOTES
Component Value Date    PROTEINU 1+ 01/24/2018     Radiology:  Kidney US:  1.  No hydronephrosis.       2.  Acoustic shadowing in the expected location of the gallbladder suggesting   contracted gallbladder with multiple calculi.  Recommend dedicated follow-up   gallbladder ultrasound. 2D ECHO:  Technically difficult study. Left ventricle is normal in size and wall thickness. Global left ventricular systolic function appears normal with estimated EF  of 55% but difficult to assess due to limited visualization. All segments not well visualized. Cannot rule-out abnormal segmental wall  motion. No significant valvular regurgitation or stenosis seen. No pericardial effusion is seen. Assessment:  1. Acute kidney injury: likely secondary to toxic acute tubular necrosis, non oliguric, improving. 2. Hypertension. 3. Respiratory failure currently on nasal  BiPAP. 4. Influenza A.  5. Multifocal pneumonia, aspiration pneumonia. 6. Parkinson's disease. 7. Ischemic heart disease with prior CABG echocardiogram demonstrating preserved EF. 8.   Hypernatremia. 9.   Hypokalemia. Plan:  Blood pressure improved today. Continue norvasc, continue to hold ACE-I. Decrease D5W to 40 ml/hr, monitor Na level. Avoid hypotension, nephrotoxic drugs, Fleets enema and IV contrast exposure. Follow up labs ordered for later today and for AM.   We'll continue to follow. Please do not hesitate to call with questions. Electronically signed by Danna Norton MD on 1/29/2018 at 9:24 [address] [facility].   Ph: [phone]

## 2018-01-29 NOTE — PROGRESS NOTES
limited by endurance; Patient limited by fatigue   Plan   Times per week 1-2x/D, 6-7D/week   Current Treatment Recommendations Strengthening;Balance Training;Functional Mobility Training;Home Exercise Program;Safety Education & Training;Patient/Caregiver Education & Training   Lulu Meek PTA     Time in: 7249 Time out: 0665

## 2018-01-30 ENCOUNTER — APPOINTMENT (OUTPATIENT)
Dept: GENERAL RADIOLOGY | Age: 83
DRG: 193 | End: 2018-01-30
Payer: MEDICARE

## 2018-01-30 LAB
ANION GAP SERPL CALCULATED.3IONS-SCNC: 10 MMOL/L (ref 9–17)
BUN BLDV-MCNC: 27 MG/DL (ref 8–23)
BUN/CREAT BLD: 29 (ref 9–20)
CALCIUM SERPL-MCNC: 7.6 MG/DL (ref 8.6–10.4)
CHLORIDE BLD-SCNC: 108 MMOL/L (ref 98–107)
CO2: 25 MMOL/L (ref 20–31)
CREAT SERPL-MCNC: 0.94 MG/DL (ref 0.7–1.2)
GFR AFRICAN AMERICAN: >60 ML/MIN
GFR NON-AFRICAN AMERICAN: >60 ML/MIN
GFR SERPL CREATININE-BSD FRML MDRD: ABNORMAL ML/MIN/{1.73_M2}
GFR SERPL CREATININE-BSD FRML MDRD: ABNORMAL ML/MIN/{1.73_M2}
GLUCOSE BLD-MCNC: 103 MG/DL (ref 70–99)
GLUCOSE BLD-MCNC: 79 MG/DL (ref 75–110)
MAGNESIUM: 2.1 MG/DL (ref 1.6–2.6)
PHOSPHORUS: 2.7 MG/DL (ref 2.5–4.5)
POTASSIUM SERPL-SCNC: 3.3 MMOL/L (ref 3.7–5.3)
SODIUM BLD-SCNC: 143 MMOL/L (ref 135–144)

## 2018-01-30 PROCEDURE — 80048 BASIC METABOLIC PNL TOTAL CA: CPT

## 2018-01-30 PROCEDURE — 2500000003 HC RX 250 WO HCPCS: Performed by: INTERNAL MEDICINE

## 2018-01-30 PROCEDURE — 84100 ASSAY OF PHOSPHORUS: CPT

## 2018-01-30 PROCEDURE — 2700000000 HC OXYGEN THERAPY PER DAY

## 2018-01-30 PROCEDURE — 94640 AIRWAY INHALATION TREATMENT: CPT

## 2018-01-30 PROCEDURE — 94761 N-INVAS EAR/PLS OXIMETRY MLT: CPT

## 2018-01-30 PROCEDURE — 71045 X-RAY EXAM CHEST 1 VIEW: CPT

## 2018-01-30 PROCEDURE — 6360000002 HC RX W HCPCS: Performed by: FAMILY MEDICINE

## 2018-01-30 PROCEDURE — 97530 THERAPEUTIC ACTIVITIES: CPT

## 2018-01-30 PROCEDURE — 6360000002 HC RX W HCPCS: Performed by: INTERNAL MEDICINE

## 2018-01-30 PROCEDURE — 2580000003 HC RX 258: Performed by: INTERNAL MEDICINE

## 2018-01-30 PROCEDURE — 2060000000 HC ICU INTERMEDIATE R&B

## 2018-01-30 PROCEDURE — 92611 MOTION FLUOROSCOPY/SWALLOW: CPT

## 2018-01-30 PROCEDURE — 6370000000 HC RX 637 (ALT 250 FOR IP): Performed by: INTERNAL MEDICINE

## 2018-01-30 PROCEDURE — 74230 X-RAY XM SWLNG FUNCJ C+: CPT

## 2018-01-30 PROCEDURE — 92526 ORAL FUNCTION THERAPY: CPT

## 2018-01-30 PROCEDURE — 2580000003 HC RX 258: Performed by: FAMILY MEDICINE

## 2018-01-30 PROCEDURE — 97535 SELF CARE MNGMENT TRAINING: CPT | Performed by: NURSE PRACTITIONER

## 2018-01-30 PROCEDURE — 83735 ASSAY OF MAGNESIUM: CPT

## 2018-01-30 PROCEDURE — G8997 SWALLOW GOAL STATUS: HCPCS

## 2018-01-30 PROCEDURE — 99222 1ST HOSP IP/OBS MODERATE 55: CPT | Performed by: INTERNAL MEDICINE

## 2018-01-30 PROCEDURE — 82947 ASSAY GLUCOSE BLOOD QUANT: CPT

## 2018-01-30 PROCEDURE — 6370000000 HC RX 637 (ALT 250 FOR IP): Performed by: FAMILY MEDICINE

## 2018-01-30 PROCEDURE — 36592 COLLECT BLOOD FROM PICC: CPT

## 2018-01-30 PROCEDURE — 97530 THERAPEUTIC ACTIVITIES: CPT | Performed by: NURSE PRACTITIONER

## 2018-01-30 PROCEDURE — G8996 SWALLOW CURRENT STATUS: HCPCS

## 2018-01-30 PROCEDURE — C9113 INJ PANTOPRAZOLE SODIUM, VIA: HCPCS | Performed by: FAMILY MEDICINE

## 2018-01-30 RX ORDER — TAMSULOSIN HYDROCHLORIDE 0.4 MG/1
0.4 CAPSULE ORAL DAILY
Status: DISCONTINUED | OUTPATIENT
Start: 2018-01-30 | End: 2018-02-02

## 2018-01-30 RX ORDER — HYDRALAZINE HYDROCHLORIDE 20 MG/ML
10 INJECTION INTRAMUSCULAR; INTRAVENOUS EVERY 6 HOURS PRN
Status: DISCONTINUED | OUTPATIENT
Start: 2018-01-30 | End: 2018-02-05 | Stop reason: HOSPADM

## 2018-01-30 RX ADMIN — Medication 10 ML: at 21:12

## 2018-01-30 RX ADMIN — CARBIDOPA AND LEVODOPA 1 TABLET: 25; 100 TABLET ORAL at 21:11

## 2018-01-30 RX ADMIN — Medication 3.38 G: at 13:19

## 2018-01-30 RX ADMIN — LEVALBUTEROL 1.25 MG: 1.25 SOLUTION, CONCENTRATE RESPIRATORY (INHALATION) at 07:32

## 2018-01-30 RX ADMIN — ACETYLCYSTEINE 600 MG: 200 SOLUTION ORAL; RESPIRATORY (INHALATION) at 15:15

## 2018-01-30 RX ADMIN — DEXTROSE MONOHYDRATE: 50 INJECTION, SOLUTION INTRAVENOUS at 23:09

## 2018-01-30 RX ADMIN — PREDNISONE 10 MG: 10 TABLET ORAL at 08:48

## 2018-01-30 RX ADMIN — LEVALBUTEROL 1.25 MG: 1.25 SOLUTION, CONCENTRATE RESPIRATORY (INHALATION) at 20:05

## 2018-01-30 RX ADMIN — LEVALBUTEROL 1.25 MG: 1.25 SOLUTION, CONCENTRATE RESPIRATORY (INHALATION) at 15:15

## 2018-01-30 RX ADMIN — PANTOPRAZOLE SODIUM 40 MG: 40 INJECTION, POWDER, FOR SOLUTION INTRAVENOUS at 21:12

## 2018-01-30 RX ADMIN — METOCLOPRAMIDE 5 MG: 5 INJECTION, SOLUTION INTRAMUSCULAR; INTRAVENOUS at 16:38

## 2018-01-30 RX ADMIN — ACETYLCYSTEINE 800 MG: 200 SOLUTION ORAL; RESPIRATORY (INHALATION) at 07:34

## 2018-01-30 RX ADMIN — POLYETHYLENE GLYCOL (3350) 17 G: 17 POWDER, FOR SOLUTION ORAL at 08:48

## 2018-01-30 RX ADMIN — IPRATROPIUM BROMIDE 0.5 MG: 0.5 SOLUTION RESPIRATORY (INHALATION) at 15:15

## 2018-01-30 RX ADMIN — POTASSIUM CHLORIDE 10 MEQ: 7.46 INJECTION, SOLUTION INTRAVENOUS at 12:14

## 2018-01-30 RX ADMIN — LEVALBUTEROL 1.25 MG: 1.25 SOLUTION, CONCENTRATE RESPIRATORY (INHALATION) at 11:23

## 2018-01-30 RX ADMIN — POTASSIUM CHLORIDE 10 MEQ: 7.46 INJECTION, SOLUTION INTRAVENOUS at 16:39

## 2018-01-30 RX ADMIN — IPRATROPIUM BROMIDE 0.5 MG: 0.5 SOLUTION RESPIRATORY (INHALATION) at 20:05

## 2018-01-30 RX ADMIN — IPRATROPIUM BROMIDE 0.5 MG: 0.5 SOLUTION RESPIRATORY (INHALATION) at 07:32

## 2018-01-30 RX ADMIN — Medication 3.38 G: at 21:39

## 2018-01-30 RX ADMIN — CARBIDOPA AND LEVODOPA 1 TABLET: 25; 100 TABLET ORAL at 08:48

## 2018-01-30 RX ADMIN — Medication 3.38 G: at 08:48

## 2018-01-30 RX ADMIN — DEXTROSE MONOHYDRATE: 50 INJECTION, SOLUTION INTRAVENOUS at 05:02

## 2018-01-30 RX ADMIN — METOCLOPRAMIDE 5 MG: 5 INJECTION, SOLUTION INTRAMUSCULAR; INTRAVENOUS at 21:12

## 2018-01-30 RX ADMIN — POTASSIUM CHLORIDE 10 MEQ: 7.46 INJECTION, SOLUTION INTRAVENOUS at 13:18

## 2018-01-30 RX ADMIN — METOCLOPRAMIDE 5 MG: 5 INJECTION, SOLUTION INTRAMUSCULAR; INTRAVENOUS at 12:14

## 2018-01-30 RX ADMIN — PREDNISONE 10 MG: 10 TABLET ORAL at 13:19

## 2018-01-30 RX ADMIN — POTASSIUM CHLORIDE 10 MEQ: 7.46 INJECTION, SOLUTION INTRAVENOUS at 15:00

## 2018-01-30 RX ADMIN — ACETYLCYSTEINE 800 MG: 200 SOLUTION ORAL; RESPIRATORY (INHALATION) at 20:06

## 2018-01-30 RX ADMIN — AMLODIPINE BESYLATE 5 MG: 5 TABLET ORAL at 08:48

## 2018-01-30 RX ADMIN — IPRATROPIUM BROMIDE 0.5 MG: 0.5 SOLUTION RESPIRATORY (INHALATION) at 11:23

## 2018-01-30 RX ADMIN — FLUCONAZOLE 100 MG: 2 INJECTION INTRAVENOUS at 20:50

## 2018-01-30 RX ADMIN — Medication 10 ML: at 21:16

## 2018-01-30 ASSESSMENT — PAIN DESCRIPTION - LOCATION
LOCATION: GENERALIZED
LOCATION: GENERALIZED

## 2018-01-30 ASSESSMENT — PAIN SCALES - GENERAL: PAINLEVEL_OUTOF10: 5

## 2018-01-30 NOTE — PROGRESS NOTES
Occupational Therapy  DATE: 2018    NAME: Andrew Sierra  MRN: [id number]   : 18 1507   Restrictions/Precautions   Restrictions/Precautions General Precautions; Fall Risk;Isolation   Position Activity Restriction   Other position/activity restrictions up with assist, bed alarm, kumar, DNR-CCA    General   Chart Reviewed Yes   Patient assessed for rehabilitation services? Yes   Response to previous treatment Patient with no complaints from previous session   Family / Caregiver Present Yes  (Son and daughter)   Pain Assessment   Patient Currently in Pain Yes   Pain Location Generalized   Oxygen Therapy   O2 Device Nasal cannula   O2 Flow Rate (L/min) 4 L/min   Orientation   Overall Orientation Status X   Attendance   Participation Active participation   ADL   Feeding Minimal assistance  ([place] assist)   Grooming Maximum assistance   Balance   Sitting Balance Maximum assistance  (MaxA-CGA with BUE support, R lateral lean noted)   Bed mobility   Rolling to Left Maximum assistance   Rolling to Right Maximum assistance   Supine to Sit Maximum assistance   Sit to Supine Maximum assistance   Scooting Maximal assistance   Comment x3 assist, max VC for hand placement and tech   Cognition   Overall Cognitive Status Impaired   Perception   Overall Perceptual Status Impaired   Unilateral Attention Cues to maintain midline in sitting   Patient Goals    Patient goals  pt unable to state; per dtr eventually home with spouse    Short term goals   Time Frame for Short term goals By discharge, pt to demo    Short term goal 1 min assist with self feeding with AE as needed and with min verbal instruction/tactile assist.     Short term goal 2 caregivers to demo good understanding of BUE HEP with hand outs as needed. Short term goal 3 bed mob techniques with min assist with use of rail. Short term goal 4 postural control seated at EOB with SBA and > 20 mins.      Short term goal 5 simple groom tasks with min

## 2018-01-30 NOTE — CARE COORDINATION
Discharge planning    Update given per Jasmyn Spencer. Patient to have swallow study and possible PEG vs TPN. Also on IV atb. Potential for LTAC vs snf.  At this time Jalil Bliss has referral but will discuss with attending and see if would like to have LTAC evaluate ( pt is medicare )
GM spoke with [facility] admission regarding referral, GM informed the [place] facility is getting bed tight. GM spoke with pt RN Prabhu Kowalski regarding possible discharge timeframe Prabhu Kowalski reports probably a couple days. GM called [facility] to inform possible discharge Wednesday. GM instructed to contact the facility to check status of beds tomorrow. [facility] liaison will be out tomorrow to complete an onsite.   GM following
discharge: Yes    Discharge Plan: Met with patient and dtr Dr. Flex Aguirre at bedside. Pt not able to participate in conversation. Was transferred last evening from ICU. Pt and his wife have been staying with Dr. Flex Aguirre since before Thanksgiving. They stay here in winter months from [place]. Wife is retired PTA and is oxygen dependent. Was admitted for Influenza A and pneumonia and has been off and on BIPAP. Pt was in [place] [address] in 2015 S/P CVA. Has home O2 from [facility] and has needed much assistance with care. Has history of Parkinsons. Therapy is recommending SNF and dtr interested in [address] [place] [address] if pt is not able to transfer with gait belt. Still considering HC and wants to talk to friend before choosing home agency. VM left with SW to follow and ARLETTE initiated.         Electronically signed by Ez Bautista RN on 1/28/18 at 11:28 AM

## 2018-01-30 NOTE — PROCEDURES
oral intake, Small bites/sips, External pacing      Recommendations/Treatment  Requires SLP Intervention: Yes        D/C Recommendations: SNF    Recommended Exercises:    Therapeutic Interventions: Laryngeal exercises, Tongue base strengthening         Education: Images and recommendations were reviewed with pt following this exam.   Patient Education: yes  Patient Education Response: Verbalizes understanding    Prognosis  Prognosis for safe diet advancement: guarded  Duration/Frequency of Treatment  Duration/Frequency of Treatment: 2-3X      Goals:    Long Term: Pt. Will tolerate least restrictive diet with no overt/clinical s/s of aspiration. Short Term:     Goal 1: O/M treatment program for dysphagia        Oral Preparation / Oral Phase  Oral Phase: WFL  Oral Phase: Adequate A-P transit and oral transit for consistencies tested    Pharyngeal Phase  Pharyngeal Phase: Impaired    Pharyngeal: PUree/Fruit: + penetration and + aspiration after swallow from moderate pharyngeal residue,  + cough with aspiration    Dysphagia Outcome Severity Scale: Level 1: Severe dysphagia- NPO. Unable to tolerate any PO safely  Penetration-Aspiration Scale (PAS): 7 - Material enters the airway, passes below the vocal folds, and is not ejected from the trachea despite effort    Esophageal Phase  Esophageal Screen: [facility]        Pain   Patient Currently in Pain: No  Pain Level: 5  Pain Type: Acute pain  Pain Location: Generalized    G-Code:  SLP G-Codes  Functional Limitations: Swallowing  Swallow Current Status (): 100 percent impaired, limited or restricted  Swallow Goal Status ():  At least 80 percent but less than 100 percent impaired, limited or restricted      Therapy Time:   Individual Concurrent Group Co-treatment   Time In 1530         Time Out 1540         [facility], 1/30/2018, 3:43 PM

## 2018-01-30 NOTE — PROGRESS NOTES
Speech Language Pathology  Speech Language Pathology  [facility]    Dysphagia Treatment Note    Date: 1/30/2018  Patients Name: Rica Osman  MRN: [id number]  Diagnosis: dysphagia   Patient Active Problem List   Diagnosis Code    Aspiration pneumonia ([facility] J69.0    Influenza A J10.1    Acute on chronic renal insufficiency N28.9, N18.9    Parkinson disease ([facility] G20    Acute respiratory distress R06.03    Hypoxia R09.02    Acute hypernatremia E87.0    Hypokalemia E87.6    Cerebrovascular disease I67.9    Essential hypertension I10    Closed compression fracture of L1 lumbar vertebra (HCC) S32.010A       Pain: 0/10    Dysphagia Treatment  Treatment time: 910-920     Subjective: [x] Alert [x] Cooperative     [] Confused     [] Agitated    [] Lethargic    Objective/Assessment:    Pt. Up in bed with breakfast tray (pureed level I with nectar thick liquid). Daughter present and feeding pt. Pt. With inconsistent s/s of aspiration with pureed and nectar thick liquid. ST recommends NPO and modified barium swallow study at this time to determine appropriate/safest diet. Spoke with daughter, who feels MBSS should be completed. She feels pt. Would have more success with MBSS if completed in the afternoon. If pt. Not lethargic and appropriate for MBSS, order to be placed by RN. Results and recommendations reported to RN. Plan:  [x] Continue ST services    [] Discharge from ST:        Discharge recommendations: [] Inpatient Rehab   [x] [facility]   [] Outpatient Therapy  [] Follow up at trauma clinic   [] Other:         Treatment completed by: Humphrey Lui M.A.  CCC-SLP

## 2018-01-31 ENCOUNTER — APPOINTMENT (OUTPATIENT)
Dept: INTERVENTIONAL RADIOLOGY/VASCULAR | Age: 83
DRG: 193 | End: 2018-01-31
Payer: MEDICARE

## 2018-01-31 LAB
ABSOLUTE EOS #: 0 K/UL (ref 0–0.4)
ABSOLUTE IMMATURE GRANULOCYTE: ABNORMAL K/UL (ref 0–0.3)
ABSOLUTE LYMPH #: 0.7 K/UL (ref 1–4.8)
ABSOLUTE MONO #: 0.4 K/UL (ref 0.2–0.8)
ANION GAP SERPL CALCULATED.3IONS-SCNC: 10 MMOL/L (ref 9–17)
ANION GAP SERPL CALCULATED.3IONS-SCNC: 9 MMOL/L (ref 9–17)
BASOPHILS # BLD: 1 % (ref 0–2)
BASOPHILS ABSOLUTE: 0.1 K/UL (ref 0–0.2)
BILIRUBIN URINE: NEGATIVE
BUN BLDV-MCNC: 23 MG/DL (ref 8–23)
BUN BLDV-MCNC: 24 MG/DL (ref 8–23)
BUN/CREAT BLD: 21 (ref 9–20)
BUN/CREAT BLD: 26 (ref 9–20)
CALCIUM SERPL-MCNC: 7.6 MG/DL (ref 8.6–10.4)
CALCIUM SERPL-MCNC: 7.8 MG/DL (ref 8.6–10.4)
CHLORIDE BLD-SCNC: 104 MMOL/L (ref 98–107)
CHLORIDE BLD-SCNC: 106 MMOL/L (ref 98–107)
CO2: 24 MMOL/L (ref 20–31)
CO2: 24 MMOL/L (ref 20–31)
COLOR: YELLOW
COMMENT UA: NORMAL
CREAT SERPL-MCNC: 0.94 MG/DL (ref 0.7–1.2)
CREAT SERPL-MCNC: 1.07 MG/DL (ref 0.7–1.2)
DIFFERENTIAL TYPE: ABNORMAL
EOSINOPHILS RELATIVE PERCENT: 0 % (ref 1–4)
GFR AFRICAN AMERICAN: >60 ML/MIN
GFR AFRICAN AMERICAN: >60 ML/MIN
GFR NON-AFRICAN AMERICAN: >60 ML/MIN
GFR NON-AFRICAN AMERICAN: >60 ML/MIN
GFR SERPL CREATININE-BSD FRML MDRD: ABNORMAL ML/MIN/{1.73_M2}
GLUCOSE BLD-MCNC: 104 MG/DL (ref 70–99)
GLUCOSE BLD-MCNC: 107 MG/DL (ref 70–99)
GLUCOSE URINE: NEGATIVE
HCT VFR BLD CALC: 30.7 % (ref 41–53)
HEMOGLOBIN: 10.1 G/DL (ref 13.5–17.5)
IMMATURE GRANULOCYTES: ABNORMAL %
KETONES, URINE: NEGATIVE
LEUKOCYTE ESTERASE, URINE: NEGATIVE
LYMPHOCYTES # BLD: 6 % (ref 24–44)
MAGNESIUM: 2 MG/DL (ref 1.6–2.6)
MCH RBC QN AUTO: 29.5 PG (ref 26–34)
MCHC RBC AUTO-ENTMCNC: 33 G/DL (ref 31–37)
MCV RBC AUTO: 89.3 FL (ref 80–100)
MONOCYTES # BLD: 3 % (ref 1–7)
NITRITE, URINE: NEGATIVE
NRBC AUTOMATED: ABNORMAL PER 100 WBC
PDW BLD-RTO: 12.6 % (ref 11.5–14.5)
PH UA: 5 (ref 5–8)
PHOSPHORUS: 2.7 MG/DL (ref 2.5–4.5)
PLATELET # BLD: 301 K/UL (ref 130–400)
PLATELET ESTIMATE: ABNORMAL
PMV BLD AUTO: ABNORMAL FL (ref 6–12)
POTASSIUM SERPL-SCNC: 3.5 MMOL/L (ref 3.7–5.3)
POTASSIUM SERPL-SCNC: 3.9 MMOL/L (ref 3.7–5.3)
PROTEIN UA: NEGATIVE
RBC # BLD: 3.44 M/UL (ref 4.5–5.9)
RBC # BLD: ABNORMAL 10*6/UL
SEG NEUTROPHILS: 90 % (ref 36–66)
SEGMENTED NEUTROPHILS ABSOLUTE COUNT: 11.4 K/UL (ref 1.8–7.7)
SODIUM BLD-SCNC: 138 MMOL/L (ref 135–144)
SODIUM BLD-SCNC: 139 MMOL/L (ref 135–144)
SPECIFIC GRAVITY UA: 1.01 (ref 1–1.03)
TURBIDITY: CLEAR
URINE HGB: NEGATIVE
UROBILINOGEN, URINE: NORMAL
WBC # BLD: 12.6 K/UL (ref 3.5–11)
WBC # BLD: ABNORMAL 10*3/UL

## 2018-01-31 PROCEDURE — 94761 N-INVAS EAR/PLS OXIMETRY MLT: CPT

## 2018-01-31 PROCEDURE — 97530 THERAPEUTIC ACTIVITIES: CPT

## 2018-01-31 PROCEDURE — 84100 ASSAY OF PHOSPHORUS: CPT

## 2018-01-31 PROCEDURE — 36415 COLL VENOUS BLD VENIPUNCTURE: CPT

## 2018-01-31 PROCEDURE — 6370000000 HC RX 637 (ALT 250 FOR IP): Performed by: FAMILY MEDICINE

## 2018-01-31 PROCEDURE — 76937 US GUIDE VASCULAR ACCESS: CPT

## 2018-01-31 PROCEDURE — 6360000002 HC RX W HCPCS: Performed by: INTERNAL MEDICINE

## 2018-01-31 PROCEDURE — 2060000000 HC ICU INTERMEDIATE R&B

## 2018-01-31 PROCEDURE — 36569 INSJ PICC 5 YR+ W/O IMAGING: CPT

## 2018-01-31 PROCEDURE — 80048 BASIC METABOLIC PNL TOTAL CA: CPT

## 2018-01-31 PROCEDURE — 6360000002 HC RX W HCPCS

## 2018-01-31 PROCEDURE — 2500000003 HC RX 250 WO HCPCS: Performed by: INTERNAL MEDICINE

## 2018-01-31 PROCEDURE — 51702 INSERT TEMP BLADDER CATH: CPT

## 2018-01-31 PROCEDURE — 2500000003 HC RX 250 WO HCPCS: Performed by: FAMILY MEDICINE

## 2018-01-31 PROCEDURE — 99233 SBSQ HOSP IP/OBS HIGH 50: CPT | Performed by: INTERNAL MEDICINE

## 2018-01-31 PROCEDURE — 05HC33Z INSERTION OF INFUSION DEVICE INTO LEFT BASILIC VEIN, PERCUTANEOUS APPROACH: ICD-10-PCS | Performed by: FAMILY MEDICINE

## 2018-01-31 PROCEDURE — 6360000002 HC RX W HCPCS: Performed by: FAMILY MEDICINE

## 2018-01-31 PROCEDURE — 85025 COMPLETE CBC W/AUTO DIFF WBC: CPT

## 2018-01-31 PROCEDURE — 81003 URINALYSIS AUTO W/O SCOPE: CPT

## 2018-01-31 PROCEDURE — 2700000000 HC OXYGEN THERAPY PER DAY

## 2018-01-31 PROCEDURE — C1713 ANCHOR/SCREW BN/BN,TIS/BN: HCPCS

## 2018-01-31 PROCEDURE — 94640 AIRWAY INHALATION TREATMENT: CPT

## 2018-01-31 PROCEDURE — 2580000003 HC RX 258: Performed by: INTERNAL MEDICINE

## 2018-01-31 PROCEDURE — 97530 THERAPEUTIC ACTIVITIES: CPT | Performed by: NURSE PRACTITIONER

## 2018-01-31 PROCEDURE — 2580000003 HC RX 258: Performed by: FAMILY MEDICINE

## 2018-01-31 PROCEDURE — C9113 INJ PANTOPRAZOLE SODIUM, VIA: HCPCS | Performed by: FAMILY MEDICINE

## 2018-01-31 PROCEDURE — 83735 ASSAY OF MAGNESIUM: CPT

## 2018-01-31 PROCEDURE — 97110 THERAPEUTIC EXERCISES: CPT

## 2018-01-31 PROCEDURE — 94660 CPAP INITIATION&MGMT: CPT

## 2018-01-31 RX ORDER — METHYLPREDNISOLONE SODIUM SUCCINATE 40 MG/ML
40 INJECTION, POWDER, LYOPHILIZED, FOR SOLUTION INTRAMUSCULAR; INTRAVENOUS EVERY 8 HOURS
Status: DISCONTINUED | OUTPATIENT
Start: 2018-01-31 | End: 2018-02-02

## 2018-01-31 RX ORDER — DEXTROSE, SODIUM CHLORIDE, AND POTASSIUM CHLORIDE 5; .45; .15 G/100ML; G/100ML; G/100ML
INJECTION INTRAVENOUS CONTINUOUS
Status: DISCONTINUED | OUTPATIENT
Start: 2018-01-31 | End: 2018-02-03

## 2018-01-31 RX ORDER — FUROSEMIDE 10 MG/ML
INJECTION INTRAMUSCULAR; INTRAVENOUS
Status: COMPLETED
Start: 2018-01-31 | End: 2018-01-31

## 2018-01-31 RX ORDER — FUROSEMIDE 10 MG/ML
20 INJECTION INTRAMUSCULAR; INTRAVENOUS ONCE
Status: COMPLETED | OUTPATIENT
Start: 2018-01-31 | End: 2018-01-31

## 2018-01-31 RX ADMIN — HYDRALAZINE HYDROCHLORIDE 10 MG: 20 INJECTION INTRAMUSCULAR; INTRAVENOUS at 00:06

## 2018-01-31 RX ADMIN — FUROSEMIDE 20 MG: 10 INJECTION, SOLUTION INTRAMUSCULAR; INTRAVENOUS at 15:10

## 2018-01-31 RX ADMIN — LEVALBUTEROL 1.25 MG: 1.25 SOLUTION, CONCENTRATE RESPIRATORY (INHALATION) at 14:53

## 2018-01-31 RX ADMIN — IPRATROPIUM BROMIDE 0.5 MG: 0.5 SOLUTION RESPIRATORY (INHALATION) at 14:53

## 2018-01-31 RX ADMIN — FUROSEMIDE 20 MG: 10 INJECTION INTRAMUSCULAR; INTRAVENOUS at 15:10

## 2018-01-31 RX ADMIN — ACETYLCYSTEINE 600 MG: 200 SOLUTION ORAL; RESPIRATORY (INHALATION) at 07:17

## 2018-01-31 RX ADMIN — IPRATROPIUM BROMIDE 0.5 MG: 0.5 SOLUTION RESPIRATORY (INHALATION) at 07:17

## 2018-01-31 RX ADMIN — FLUCONAZOLE 100 MG: 2 INJECTION INTRAVENOUS at 21:14

## 2018-01-31 RX ADMIN — POTASSIUM CHLORIDE 10 MEQ: 2 INJECTION, SOLUTION, CONCENTRATE INTRAVENOUS at 06:37

## 2018-01-31 RX ADMIN — METHYLPREDNISOLONE SODIUM SUCCINATE 40 MG: 40 INJECTION, POWDER, FOR SOLUTION INTRAMUSCULAR; INTRAVENOUS at 21:04

## 2018-01-31 RX ADMIN — CARBIDOPA AND LEVODOPA 1 TABLET: 25; 100 TABLET ORAL at 21:00

## 2018-01-31 RX ADMIN — Medication 3.38 G: at 13:09

## 2018-01-31 RX ADMIN — PANTOPRAZOLE SODIUM 40 MG: 40 INJECTION, POWDER, FOR SOLUTION INTRAVENOUS at 21:06

## 2018-01-31 RX ADMIN — LEVALBUTEROL 1.25 MG: 1.25 SOLUTION, CONCENTRATE RESPIRATORY (INHALATION) at 11:46

## 2018-01-31 RX ADMIN — Medication 10 ML: at 21:09

## 2018-01-31 RX ADMIN — LEVALBUTEROL 1.25 MG: 1.25 SOLUTION, CONCENTRATE RESPIRATORY (INHALATION) at 07:17

## 2018-01-31 RX ADMIN — Medication 3.38 G: at 05:02

## 2018-01-31 RX ADMIN — POTASSIUM CHLORIDE 10 MEQ: 2 INJECTION, SOLUTION, CONCENTRATE INTRAVENOUS at 05:38

## 2018-01-31 RX ADMIN — IPRATROPIUM BROMIDE 0.5 MG: 0.5 SOLUTION RESPIRATORY (INHALATION) at 19:17

## 2018-01-31 RX ADMIN — METOCLOPRAMIDE 5 MG: 5 INJECTION, SOLUTION INTRAMUSCULAR; INTRAVENOUS at 06:41

## 2018-01-31 RX ADMIN — IPRATROPIUM BROMIDE 0.5 MG: 0.5 SOLUTION RESPIRATORY (INHALATION) at 11:46

## 2018-01-31 RX ADMIN — POTASSIUM CHLORIDE, DEXTROSE MONOHYDRATE AND SODIUM CHLORIDE: 150; 5; 450 INJECTION, SOLUTION INTRAVENOUS at 07:43

## 2018-01-31 RX ADMIN — METOCLOPRAMIDE 5 MG: 5 INJECTION, SOLUTION INTRAMUSCULAR; INTRAVENOUS at 11:10

## 2018-01-31 RX ADMIN — ACETYLCYSTEINE 600 MG: 200 SOLUTION ORAL; RESPIRATORY (INHALATION) at 19:17

## 2018-01-31 RX ADMIN — LEVALBUTEROL 1.25 MG: 1.25 SOLUTION, CONCENTRATE RESPIRATORY (INHALATION) at 19:17

## 2018-01-31 RX ADMIN — Medication 3.38 G: at 22:23

## 2018-01-31 ASSESSMENT — PAIN SCALES - PAIN ASSESSMENT IN ADVANCED DEMENTIA (PAINAD)
CONSOLABILITY: 0
FACIALEXPRESSION: 0
BODYLANGUAGE: 1
FACIALEXPRESSION: 0
BREATHING: 0
TOTALSCORE: 6
TOTALSCORE: 6
CONSOLABILITY: 1
BREATHING: 1
TOTALSCORE: 0
NEGVOCALIZATION: 1
NEGVOCALIZATION: 0
BODYLANGUAGE: 0
BODYLANGUAGE: 1
CONSOLABILITY: 1
FACIALEXPRESSION: 2
TOTALSCORE: 2
BREATHING: 1
BREATHING: 0
NEGVOCALIZATION: 1
TOTALSCORE: 2
BREATHING: 1
NEGVOCALIZATION: 0
NEGVOCALIZATION: 0
CONSOLABILITY: 0
CONSOLABILITY: 1
CONSOLABILITY: 1
FACIALEXPRESSION: 2
BODYLANGUAGE: 1
FACIALEXPRESSION: 0
BODYLANGUAGE: 1
FACIALEXPRESSION: 0
CONSOLABILITY: 1
BODYLANGUAGE: 1
TOTALSCORE: 6
CONSOLABILITY: 1
BODYLANGUAGE: 0
TOTALSCORE: 0
TOTALSCORE: 6
BREATHING: 0
NEGVOCALIZATION: 1
BREATHING: 1
TOTALSCORE: 6
BREATHING: 0
FACIALEXPRESSION: 2
FACIALEXPRESSION: 2
NEGVOCALIZATION: 0
BREATHING: 1
CONSOLABILITY: 1
BODYLANGUAGE: 1
BODYLANGUAGE: 1
FACIALEXPRESSION: 2

## 2018-01-31 ASSESSMENT — PAIN SCALES - GENERAL: PAINLEVEL_OUTOF10: 5

## 2018-01-31 ASSESSMENT — PAIN DESCRIPTION - LOCATION: LOCATION: BACK

## 2018-01-31 ASSESSMENT — PAIN DESCRIPTION - ORIENTATION: ORIENTATION: LOWER

## 2018-01-31 NOTE — CONSULTS
Oral, BID, Vicente Wheeler MD, 10 mg at 01/30/18 1319    polyethylene glycol (GLYCOLAX) packet 17 g, 17 g, Oral, Daily, Becca S Keagan, 17 g at 01/30/18 0848    pantoprazole (PROTONIX) injection 40 mg, 40 mg, Intravenous, Nightly, 40 mg at 01/29/18 2107 **AND** sodium chloride (PF) 0.9 % injection 10 mL, 10 mL, Intravenous, Daily, Deirdre Long, 10 mL at 01/29/18 2109    dextrose 5 % solution, , Intravenous, Continuous, Deirdre Long, Last Rate: 75 mL/hr at 01/30/18 1614    amLODIPine (NORVASC) tablet 5 mg, 5 mg, Oral, BID, Becca S Keagan, 5 mg at 01/30/18 0848    fluconazole (DIFLUCAN) 100 mg in 0.9 % NaCl 50 mL premix IVPB, 100 mg, Intravenous, Q24H, Deirdre Long, Stopped at 01/29/18 2108    glucose (GLUTOSE) 40 % oral gel 15 g, 15 g, Oral, PRN, Becca S Keagan    dextrose 50 % solution 12.5 g, 12.5 g, Intravenous, PRN, Becca S Huntington    glucagon (rDNA) injection 1 mg, 1 mg, Intramuscular, PRN, Becca S Huntington    dextrose 5 % solution, 100 mL/hr, Intravenous, PRN, Becca S Keagan    ondansetron [facility]) injection 4 mg, 4 mg, Intravenous, Q6H PRN, Diane Bergeron MD    hydrALAZINE (APRESOLINE) injection 10 mg, 10 mg, Intravenous, Q6H PRN, Deirdre Long, 10 mg at 01/25/18 1605    acetaminophen (TYLENOL) suppository 650 mg, 650 mg, Rectal, Q4H PRN, Deirdre Long, 650 mg at 01/23/18 0317    piperacillin-tazobactam (ZOSYN) 3.375 g in dextrose 50 mL IVPB extended infusion (premix), 3.375 g, Intravenous, Q8H, Jenelle Hernandez MD, Stopped at 01/30/18 1725    ipratropium (ATROVENT) 0.02 % nebulizer solution 0.5 mg, 0.5 mg, Nebulization, 4x daily, Becca Boogie, 0.5 mg at 01/30/18 1515    acetylcysteine (MUCOMYST) 20 % solution 600 mg, 600 mg, Inhalation, TID, Jenelle Hernandez MD, 600 mg at 01/30/18 1516    sodium chloride flush 0.9 % injection 10 mL, 10 mL, Intravenous, 2 times per day, Deirdre Alves, 10 mL at 01/29/18 2239    sodium LABORATORY DATA: Reviewed   Lab Results   Component Value Date    WBC 13.3 (H) 01/29/2018    HGB 9.2 (L) 01/29/2018    HCT 28.2 (L) 01/29/2018    MCV 91.7 01/29/2018     01/29/2018     01/30/2018    K 3.3 (L) 01/30/2018     (H) 01/30/2018    CO2 25 01/30/2018    BUN 27 (H) 01/30/2018    CREATININE 0.94 01/30/2018    LABALBU 2.0 (L) 01/28/2018    BILITOT 0.37 01/28/2018    ALKPHOS 61 01/28/2018    AST 20 01/28/2018    ALT 31 01/28/2018      Lab Results   Component Value Date    RBC 3.07 (L) 01/29/2018    HGB 9.2 (L) 01/29/2018    MCV 91.7 01/29/2018    MCH 29.9 01/29/2018    MCHC 32.6 01/29/2018    RDW 14.1 01/29/2018    MPV 8.4 01/29/2018    BASOPCT 0 01/29/2018    LYMPHSABS 0.60 (L) 01/29/2018    MONOSABS 0.20 01/29/2018    NEUTROABS 12.50 (H) 01/29/2018    EOSABS 0.00 01/29/2018    BASOSABS 0.00 01/29/2018          DIAGNOSTIC TESTING:   Ct Abdomen Pelvis Wo Contrast Additional Contrast? None    Result Date: 1/28/2018  EXAMINATION: CT OF THE ABDOMEN AND PELVIS WITHOUT CONTRAST 1/28/2018 12:05 pm TECHNIQUE: CT of the abdomen and pelvis was performed without the administration of intravenous contrast. Multiplanar reformatted images are provided for review. Dose modulation, iterative reconstruction, and/or weight based adjustment of the mA/kV was utilized to reduce the radiation dose to as low as reasonably achievable. COMPARISON: None. HISTORY: ORDERING SYSTEM PROVIDED HISTORY: ABDOMINAL PAIN TECHNOLOGIST PROVIDED HISTORY: Additional Contrast?->None FINDINGS: Evaluation is limited by motion. Lower Chest: Please refer to dedicated CT of the chest. Organs: Evaluation of the solid organs is limited without intravenous contrast.  11 mm hypodense lesion within the dome of the liver has CT density characteristics consistent with a cyst.  Gallbladder is contracted limiting evaluation. No gallstones. No definite pancreatic lesion. No splenomegaly. No adrenal lesion. No hydronephrosis.   Peripelvic compression fx Acuity: Acute Type of Exam: Initial FINDINGS: Thoracic spine:  Skeletal structures are osteopenic. Diffuse mild degenerative and degenerative disc disease is noted. Mild anterior loss in height in L1 is noted. No subluxation is seen. Pedicles are intact. Patient is status post median sternotomy. Aortic arch is calcified. Lumbar spine:  A moderate levo scoliotic curvature of the thoracolumbar spine is noted. Grade 1 retrolisthesis of L1 upon L2 is noted. Mild anterior loss in height and L1 is noted. Diffuse moderate degenerative and degenerative disc changes are present with significant L5 facet arthropathy. Pedicles are intact. Aorta is calcified with maximum diameter of 2.9 cm. SI joints are symmetrical.     Thoracic spine:  Diffuse mild degenerative and degenerative disc disease without subluxation. Mild anterior loss in height L1. Lumbar spine:  Degenerative and degenerative disc disease as above with grade 1 retrolisthesis of L1 upon L2 and mild anterior loss in height L1. Xr Lumbar Spine (min 4 Views)    Result Date: 1/25/2018  EXAMINATION: 3 VIEWS OF THE THORACIC SPINE; VIEWS OF THE LUMBAR SPINE 1/25/2018 1:55 pm COMPARISON: None. HISTORY: ORDERING SYSTEM PROVIDED HISTORY: back pain hx of fall rule out compression fx TECHNOLOGIST PROVIDED HISTORY: Reason for exam:->back pain hx of fall rule out compression fx Ordering Physician Provided Reason for Exam: Pt c/o pain to back, r/o compression fx Acuity: Acute Type of Exam: Initial FINDINGS: Thoracic spine:  Skeletal structures are osteopenic. Diffuse mild degenerative and degenerative disc disease is noted. Mild anterior loss in height in L1 is noted. No subluxation is seen. Pedicles are intact. Patient is status post median sternotomy. Aortic arch is calcified. Lumbar spine:  A moderate levo scoliotic curvature of the thoracolumbar spine is noted. Grade 1 retrolisthesis of L1 upon L2 is noted.   Mild anterior loss in height complicated due to motion artifact. Soft Tissues/Bones: No worrisome lytic or blastic lesions are present. Patient is status post median sternotomy. Scattered areas of consolidative infiltrate as above. Shotty mediastinal lymph nodes are present. Atherosclerotic changes are seen. Us Renal Complete    Result Date: 1/24/2018  EXAMINATION: RETROPERITONEAL ULTRASOUND OF THE KIDNEYS AND URINARY BLADDER 1/24/2018 COMPARISON: None HISTORY: ORDERING SYSTEM PROVIDED HISTORY: RENAL FAILURE, ACUTE (KIDNEY INJURY) FINDINGS: Kidneys: The right kidney measures 9.2 cm in length and the left kidney measures 9.2 cm in length. Right renal cortex measures 1.2 cm. Left renal cortex measures 1.1 cm in thickness. Kidneys demonstrate normal cortical echogenicity. No evidence of hydronephrosis or intrarenal stones. Incidental finding is acoustic shadowing in the expected location of the gallbladder suggesting contracted gallbladder with multiple calculi. Bladder: There is a catheter in the urinary bladder. Urinary bladder is contracted and cannot be accurately evaluated     1. No hydronephrosis. 2.  Acoustic shadowing in the expected location of the gallbladder suggesting contracted gallbladder with multiple calculi. Recommend dedicated follow-up gallbladder ultrasound. Xr Chest Portable    Result Date: 1/30/2018  EXAMINATION: SINGLE VIEW OF THE CHEST 1/30/2018 4:01 pm COMPARISON: January 28, 2018 HISTORY: ORDERING SYSTEM PROVIDED HISTORY: infiltrate TECHNOLOGIST PROVIDED HISTORY: Reason for exam:->infiltrate Ordering Physician Provided Reason for Exam: dif breathing Relevant Medical/Surgical History: COPD  HTN  cerebral artery occlusion FINDINGS: Stable position of the right internal jugular central venous catheter. Stable cardiomediastinal silhouette. Pulmonary venous congestion is unchanged. Right suprahilar and bibasilar patchy opacities have increased. Trace bilateral pleural effusions are present.  No 1/22/2018 4:56 pm COMPARISON: None. HISTORY: ORDERING SYSTEM PROVIDED HISTORY: cough TECHNOLOGIST PROVIDED HISTORY: Reason for exam:->cough Ordering Physician Provided Reason for Exam: fatigue Acuity: Acute Type of Exam: Initial FINDINGS: Sternal wires from prior heart surgery. Heart size within normal limits with calcific plaque of the thoracic aorta. Bilateral multifocal mild patchy infiltrates, likely pneumonia, in the right upper lobe and left lower lobe. Underlying COPD changes suspected. Slight right hilar prominence is likely vascular and can be reassessed on follow-up. Slight blunting right costophrenic angle likely due to scarring versus a tiny effusion. Monitor leads overlie the chest.  The bones are osteopenic with degenerative changes of the spine. Narrowing of the left subacromial joint space suggests rotator cuff arthropathy. Multifocal patchy infiltrates in the right upper lobe and left lung base. Follow-up studies are recommended to confirm resolution. Fl Modified Barium Swallow W Video    Result Date: 1/30/2018  EXAMINATION: MODIFIED BARIUM SWALLOW WAS PERFORMED IN CONJUNCTION WITH SPEECH PATHOLOGY SERVICES TECHNIQUE: Fluoroscopic evaluation of the swallowing mechanism was performed with multiple consistency of barium product. FLUOROSCOPY DOSE AND TYPE OR TIME AND EXPOSURES: 1.1 minutes, 6.51 mGy COMPARISON: None HISTORY: ORDERING SYSTEM PROVIDED HISTORY: poss. aspiration with PO intake TECHNOLOGIST PROVIDED HISTORY: Reason for exam:->poss. aspiration with PO intake 44-year-old male with possible aspiration with p.o. intake FINDINGS: With the pureed and soft solid substance administration, there was aspiration of the residuals with a subsequent cough. The remainder of the substances were not administered. 1. Aspiration of the residuals of the pureed and soft solid substances with a subsequent cough. 2. Remainder of the substances were not administered.  Please see separate speech pathology report for full discussion of findings and recommendations. IMPRESSION: Mr. Barbara Mills is a 80 y.o. male with aspiration. Dysphagia. He continues to be on 4 L of oxygen. We will wait for 1-2 days until his breathing condition improves further and we will place PEG tube. Thank you for allowing me to participate in the care of Mr. Barbara Mills. For any further questions please do not hesitate to contact me.        Natalee Schneider MD

## 2018-01-31 NOTE — PROGRESS NOTES
his PEG till tomorrow morning.   · IV fluids per nephrology  · Labs: CBC and BMP in am, monitor renal function  · Repeat x-ray chest in a.m.  · DVT prophylaxis with low molecular weight heparin  · Will follow with you    Electronically signed by     Chuck Vargas MD on 1/31/2018 at 2:54 PM  Pulmonary Critical Care and Sleep Medicine,  [facility]  Cell: [phone]  Office: [phone]

## 2018-01-31 NOTE — PROGRESS NOTES
Assessment  Pain Assessment: 0-10  Pain Level: 5  Pain Location: Back  Pain Orientation: Lower  Vital Signs  Patient Currently in Pain: Yes       Orientation   oriented to name  Objective   Bed mobility  Rolling to Left: Maximum assistance  Rolling to Right: Maximum assistance  Supine to Sit: Maximum assistance (of 2)  Sit to Supine: Maximum assistance (of 2)  Scooting: Maximal assistance (of 2)  Comment: Pt sat EOB, eyes open & alert  Pt sat EOB for 15 minutes  Transfers  Comment: placed Gama Daniela stedy & attempted stand but pt unable to reach RUE enought to hold for support        Balance  Sitting - Static: Good  Sitting - Dynamic: Fair;+    Pt assisted from sit to sidelying with 2maxA, then rolled with maxA, & scooted to [facility] with 2maxA  Exercises  AAROM of [place] LE's for 10 reps   All lines intact, call light within reach,  and patient positioned comfortably at end of treatment. All patient needs addressed prior to ending therapy session. Assessment   Body structures, Functions, Activity limitations: Decreased functional mobility ; Decreased strength;Decreased endurance;Decreased balance    Patient requires continued skilled PT & is appropriate to D/C to [address] [facility] to increase independence with functional mobility, balance, safety awareness & activity tolerance &  enable pt to return home safely  Prognosis: Good  Patient Education: functional mobility & ex's  REQUIRES PT FOLLOW UP: Yes  Activity Tolerance  Activity Tolerance: Patient limited by fatigue;Patient limited by endurance       Discharge Recommendations:  Subacute/Skilled Nursing Facility             Goals  Short term goals  Time Frame for Short term goals: 12 visits  Short term goal 1: Inc bed mobility to [facility] or better;   Short term goal 2: Transfers to mod assist  Short term goal 3: Patient will amb 10 feet with RW and mod assist  Short term goal 4: Pt able to tolerate 30 min of activity to include 15-20 reps of ex & functional mobility to facilitate better activity tolerance;   Short term goal 5: Pt to tolerate sitting EOB 20 minutes to increase trunk control & enable pt to transfer OOB & initiate ambulation;  Patient Goals   Patient goals : able to return home to living with my wife    Plan    Plan  Times per week: 1-2x/D, 6-7D/week  Specific instructions for Next Treatment: try [facility]  Current Treatment Recommendations: Strengthening, Balance Training, Functional Mobility Training, Home Exercise Program, Safety Education & Training, Patient/Caregiver Education & Training  Safety Devices  Type of devices: Bed alarm in place, Call light within reach, Left in bed, Patient at risk for falls, Gait belt, Nurse notified  Restraints  Initially in place: No     Therapy Time   Individual Concurrent Group Co-treatment   Time In  0845         Time Out  0925         Minutes  Leti Alamo PT

## 2018-01-31 NOTE — PROGRESS NOTES
Following discussion with Dr. Malick Flores and Dr. Keri Gracia, patient will have PEG placement tomorrow. Family at bedside and aware.

## 2018-01-31 NOTE — PROGRESS NOTES
Component Value Date    LABALBU 2.0 01/28/2018   Urine Protein:    Lab Results   Component Value Date    PROTEINU 1+ 01/24/2018     Radiology:  Kidney US:  1.  No hydronephrosis.       2.  Acoustic shadowing in the expected location of the gallbladder suggesting   contracted gallbladder with multiple calculi.  Recommend dedicated follow-up   gallbladder ultrasound. 2D ECHO:  Technically difficult study. Left ventricle is normal in size and wall thickness. Global left ventricular systolic function appears normal with estimated EF  of 55% but difficult to assess due to limited visualization. All segments not well visualized. Cannot rule-out abnormal segmental wall  motion. No significant valvular regurgitation or stenosis seen. No pericardial effusion is seen. Assessment:  1. Acute kidney injury: likely secondary to toxic acute tubular necrosis, non oliguric, improving. 2. Hypertension, controlled. 3. Respiratory failure currently on nasal  BiPAP. 4. Influenza A.  5. Multifocal pneumonia, aspiration pneumonia. 6. Parkinson's disease. 7. Ischemic heart disease with prior CABG echocardiogram demonstrating preserved EF. 8.   Hypernatremia. 9.   Hypokalemia. Plan: For PEG tube placement today. Continue norvasc, continue to hold ACE-I. Decrease IVF to 50 ml/hr, monitor Na level. Avoid hypotension, nephrotoxic drugs, Fleets enema and IV contrast exposure. Follow up labs ordered for later today and for AM.   We'll continue to follow. Please do not hesitate to call with questions. Electronically signed by Kashif Finnegan MD on 1/31/2018 at [place] [address] [facility].   Ph: [phone]

## 2018-01-31 NOTE — PROGRESS NOTES
kg)   SpO2 94%   BMI 22.86 kg/m²   General appearance: alert, appears stated age and cooperative  Skin: Skin color, texture, turgor normal.   HEENT: Head: Normocephalic, no lesions, without obvious abnormality.   Neck: no adenopathy, no carotid bruit, no JVD, supple, symmetrical, trachea midline and thyroid not enlarged, symmetric, no tenderness/mass/nodules  Lungs: diminished breath sounds bibasilar, rhonchi anterior - bilateral and posterior - bilateral and wheezes RLL  Heart: regular rate and rhythm, S1, S2 normal, no murmur, click, rub or gallop  Abdomen: soft, non-tender; bowel sounds normal; no masses,  no organomegaly  Extremities: extremities normal, atraumatic, no cyanosis or edema  Lymphatic: No significant lymph node enlargement papable  Neurologic: Mental status: Alert, oriented, thought content appropriate      Assessment & Plan:    Patient Active Problem List:     Aspiration pneumonia ([address]     Influenza A     Acute on chronic renal insufficiency     Parkinson disease ([address]     Acute respiratory distress     Hypoxia     Acute hypernatremia     Hypokalemia     Cerebrovascular disease     Essential hypertension     Closed compression fracture of L1 lumbar vertebra ([address]      See orders   Disposition: goal restart iv solumedrol up as much as possible , lasix 20 given ,, aerosol hopefully stable for peg tomorrow so nutrition can be restarted  Discussed with dr Rakesh Doe and dr Judith Petit anesthesia  ,, take off dnr cc arrest for procedure incase intubation required   moniter renal function h/h wbc may go up due to steroids ,, continue ppi ,, gi rest will need sinemet as soon as possible     Collin Xavier

## 2018-01-31 NOTE — PROGRESS NOTES
Occupational Therapy  DATE: 2018    NAME: Amadeo Shone  MRN: [id number]   : 1935  [facility]  Occupational Therapy Not Seen Note    Patient not available for Occupational Therapy due to:    [] Testing:    [] Hemodialysis    [] Cancelled by RN:    []Refusal by Patient:    [] Surgery:     [] Intubation:     [] Pain Medication:    [] Sedation:     [] Spine Precautions :    [] Medical Instability:    [x] Other: Pt with PT        Gage JUNIOR

## 2018-01-31 NOTE — PROGRESS NOTES
Chest: Please refer to dedicated CT of the chest. Organs: Evaluation of the solid organs is limited without intravenous contrast.  11 mm hypodense lesion within the dome of the liver has CT density characteristics consistent with a cyst.  Gallbladder is contracted limiting evaluation. No gallstones. No definite pancreatic lesion. No splenomegaly. No adrenal lesion. No hydronephrosis. Peripelvic cysts are suspected. No renal calculus. GI/Bowel: Large amount of contents are seen within the stomach. No definite bowel obstruction. Moderate amount of stool is seen within the colon. Pelvis: Liz catheter is seen within the urinary bladder. No bladder calculus. No focal drainable pelvic collection. Peritoneum/Retroperitoneum: Atherosclerotic calcification of the abdominal aorta. Abdominal aorta is slightly tortuous. No definite aneurysmal dilatation. No adenopathy. Bones/Soft Tissues: Left inguinal hernia contains fat. No acute soft tissue abnormality. Mild age-indeterminate L1 compression fracture. Lumbar spine degenerative changes are seen. Limited study. No definite bowel obstruction. Large amount of contents within the stomach. Moderate amount of stool within the colon. Age-indeterminate L1 compression fracture. Xr Chest (single View Frontal)    Result Date: 1/23/2018  EXAMINATION: SINGLE VIEW OF THE CHEST 1/23/2018 2:51 am COMPARISON: 01/22/2018 HISTORY: ORDERING SYSTEM PROVIDED HISTORY: pulmany edema TECHNOLOGIST PROVIDED HISTORY: Reason for exam:->pulmany edema Ordering Physician Provided Reason for Exam:  P edema Acuity: Acute Type of Exam: Initial FINDINGS: Increasing airspace opacity in the right upper lobe. Left basilar atelectatic changes have improved. Cardiomediastinal silhouette and pulmonary vasculature stable and within normal limits. Median sternotomy wires CABG clips again noted. No pleural effusion or pneumothorax. Developing infiltrate right upper lobe.      Xr Thoracic osteopenic. Diffuse mild degenerative and degenerative disc disease is noted. Mild anterior loss in height in L1 is noted. No subluxation is seen. Pedicles are intact. Patient is status post median sternotomy. Aortic arch is calcified. Lumbar spine:  A moderate levo scoliotic curvature of the thoracolumbar spine is noted. Grade 1 retrolisthesis of L1 upon L2 is noted. Mild anterior loss in height and L1 is noted. Diffuse moderate degenerative and degenerative disc changes are present with significant L5 facet arthropathy. Pedicles are intact. Aorta is calcified with maximum diameter of 2.9 cm. SI joints are symmetrical.     Thoracic spine:  Diffuse mild degenerative and degenerative disc disease without subluxation. Mild anterior loss in height L1. Lumbar spine:  Degenerative and degenerative disc disease as above with grade 1 retrolisthesis of L1 upon L2 and mild anterior loss in height L1. Ct Chest Wo Contrast    Result Date: 1/28/2018  EXAMINATION: CT OF THE CHEST WITHOUT CONTRAST 1/28/2018 12:04 pm TECHNIQUE: CT of the chest was performed without the administration of intravenous contrast. Multiplanar reformatted images are provided for review. Dose modulation, iterative reconstruction, and/or weight based adjustment of the mA/kV was utilized to reduce the radiation dose to as low as reasonably achievable. COMPARISON: None. HISTORY: ORDERING SYSTEM PROVIDED HISTORY: PNEUMONIA COMPLICATED FINDINGS: Mediastinum: No adenopathy in the thoracic inlet or axillary regions is noted. Aortic arch is heavily calcified and ectatic but uniform in caliber. Shotty mediastinal lymph nodes are present without izzy adenopathy. Coronary artery aortic valvular calcifications are noted. Heart size is normal without pericardial effusion.  Lungs/pleura: Areas of airspace disease of moderate consolidative character are noted in the inferior right upper lobe, posterior mid left upper lobe, and in the right lower lobe Earlier today at 2:56 a.m. HISTORY: Central line placement. FINDINGS: Interval placement of right IJ catheter with tip at the cavoatrial junction. No visible pneumothorax. Evaluation is otherwise unchanged from earlier this morning. Right IJ catheter tip at the cavoatrial junction. No visible pneumothorax. Xr Chest Portable    Result Date: 1/22/2018  EXAMINATION: SINGLE VIEW OF THE CHEST 1/22/2018 4:56 pm COMPARISON: None. HISTORY: ORDERING SYSTEM PROVIDED HISTORY: cough TECHNOLOGIST PROVIDED HISTORY: Reason for exam:->cough Ordering Physician Provided Reason for Exam: fatigue Acuity: Acute Type of Exam: Initial FINDINGS: Sternal wires from prior heart surgery. Heart size within normal limits with calcific plaque of the thoracic aorta. Bilateral multifocal mild patchy infiltrates, likely pneumonia, in the right upper lobe and left lower lobe. Underlying COPD changes suspected. Slight right hilar prominence is likely vascular and can be reassessed on follow-up. Slight blunting right costophrenic angle likely due to scarring versus a tiny effusion. Monitor leads overlie the chest.  The bones are osteopenic with degenerative changes of the spine. Narrowing of the left subacromial joint space suggests rotator cuff arthropathy. Multifocal patchy infiltrates in the right upper lobe and left lung base. Follow-up studies are recommended to confirm resolution. Ir Ultrasound Guidance Vascular Access    Result Date: 1/31/2018  Radiology exam is complete. No Radiologist dictation. Please follow up with ordering provider. Fl Modified Barium Swallow W Video    Result Date: 1/30/2018  EXAMINATION: MODIFIED BARIUM SWALLOW WAS PERFORMED IN CONJUNCTION WITH SPEECH PATHOLOGY SERVICES TECHNIQUE: Fluoroscopic evaluation of the swallowing mechanism was performed with multiple consistency of barium product.  FLUOROSCOPY DOSE AND TYPE OR TIME AND EXPOSURES: 1.1 minutes, 6.51 mGy COMPARISON: None HISTORY: ORDERING SYSTEM PROVIDED HISTORY: poss. aspiration with PO intake TECHNOLOGIST PROVIDED HISTORY: Reason for exam:->poss. aspiration with PO intake 80-year-old male with possible aspiration with p.o. intake FINDINGS: With the pureed and soft solid substance administration, there was aspiration of the residuals with a subsequent cough. The remainder of the substances were not administered. 1. Aspiration of the residuals of the pureed and soft solid substances with a subsequent cough. 2. Remainder of the substances were not administered. Please see separate speech pathology report for full discussion of findings and recommendations. Intake/Output Summary (Last 24 hours) at 01/31/18 1626  Last data filed at 01/31/18 1415   Gross per 24 hour   Intake          2720.97 ml   Output              850 ml   Net          1870.97 ml        IMPRESSION / RECOMMENDATIONS: Mr. Sharmin Hoff is a 80 y.o. male followed for management of aspiration and dysphagia. He was plan for PEG tube placement today. Due to her respiratory condition the procedure was canceled. Discussed with anesthesiology and pulmonology. Possible PEG tube placement tomorrow. Discussed with family risks and options. Consider NG tube for few days until respiratory condition improves. We will discuss with Dr Parth Renae.         Roxanne Carrillo MD

## 2018-02-01 ENCOUNTER — ANESTHESIA EVENT (OUTPATIENT)
Dept: OPERATING ROOM | Age: 83
DRG: 193 | End: 2018-02-01
Payer: MEDICARE

## 2018-02-01 ENCOUNTER — APPOINTMENT (OUTPATIENT)
Dept: GENERAL RADIOLOGY | Age: 83
DRG: 193 | End: 2018-02-01
Payer: MEDICARE

## 2018-02-01 ENCOUNTER — ANESTHESIA (OUTPATIENT)
Dept: OPERATING ROOM | Age: 83
DRG: 193 | End: 2018-02-01
Payer: MEDICARE

## 2018-02-01 VITALS — SYSTOLIC BLOOD PRESSURE: 141 MMHG | OXYGEN SATURATION: 92 % | DIASTOLIC BLOOD PRESSURE: 59 MMHG | TEMPERATURE: 96.8 F

## 2018-02-01 LAB
ABSOLUTE EOS #: 0 K/UL (ref 0–0.4)
ABSOLUTE IMMATURE GRANULOCYTE: ABNORMAL K/UL (ref 0–0.3)
ABSOLUTE LYMPH #: 0.29 K/UL (ref 1–4.8)
ABSOLUTE MONO #: 0.1 K/UL (ref 0.2–0.8)
ANION GAP SERPL CALCULATED.3IONS-SCNC: 13 MMOL/L (ref 9–17)
BASOPHILS # BLD: 0 %
BASOPHILS ABSOLUTE: 0 K/UL (ref 0–0.2)
BUN BLDV-MCNC: 28 MG/DL (ref 8–23)
BUN/CREAT BLD: 24 (ref 9–20)
CALCIUM SERPL-MCNC: 8 MG/DL (ref 8.6–10.4)
CHLORIDE BLD-SCNC: 104 MMOL/L (ref 98–107)
CO2: 23 MMOL/L (ref 20–31)
CREAT SERPL-MCNC: 1.18 MG/DL (ref 0.7–1.2)
DIFFERENTIAL TYPE: ABNORMAL
EOSINOPHILS RELATIVE PERCENT: 0 % (ref 1–4)
GFR AFRICAN AMERICAN: >60 ML/MIN
GFR NON-AFRICAN AMERICAN: 59 ML/MIN
GFR SERPL CREATININE-BSD FRML MDRD: ABNORMAL ML/MIN/{1.73_M2}
GFR SERPL CREATININE-BSD FRML MDRD: ABNORMAL ML/MIN/{1.73_M2}
GLUCOSE BLD-MCNC: 133 MG/DL (ref 70–99)
HCT VFR BLD CALC: 30.5 % (ref 41–53)
HEMOGLOBIN: 9.7 G/DL (ref 13.5–17.5)
IMMATURE GRANULOCYTES: ABNORMAL %
LYMPHOCYTES # BLD: 3 % (ref 24–44)
MAGNESIUM: 2.2 MG/DL (ref 1.6–2.6)
MCH RBC QN AUTO: 28.8 PG (ref 26–34)
MCHC RBC AUTO-ENTMCNC: 32 G/DL (ref 31–37)
MCV RBC AUTO: 90.1 FL (ref 80–100)
MONOCYTES # BLD: 1 % (ref 1–7)
NRBC AUTOMATED: ABNORMAL PER 100 WBC
PDW BLD-RTO: 13 % (ref 11.5–14.5)
PHOSPHORUS: 4.5 MG/DL (ref 2.5–4.5)
PLATELET # BLD: 304 K/UL (ref 130–400)
PLATELET ESTIMATE: ABNORMAL
PMV BLD AUTO: ABNORMAL FL (ref 6–12)
POTASSIUM SERPL-SCNC: 4.3 MMOL/L (ref 3.7–5.3)
RBC # BLD: 3.39 M/UL (ref 4.5–5.9)
RBC # BLD: ABNORMAL 10*6/UL
SEG NEUTROPHILS: 96 % (ref 36–66)
SEGMENTED NEUTROPHILS ABSOLUTE COUNT: 9.31 K/UL (ref 1.8–7.7)
SODIUM BLD-SCNC: 140 MMOL/L (ref 135–144)
WBC # BLD: 9.7 K/UL (ref 3.5–11)
WBC # BLD: ABNORMAL 10*3/UL

## 2018-02-01 PROCEDURE — 2500000003 HC RX 250 WO HCPCS: Performed by: INTERNAL MEDICINE

## 2018-02-01 PROCEDURE — 85025 COMPLETE CBC W/AUTO DIFF WBC: CPT

## 2018-02-01 PROCEDURE — 0DH63UZ INSERTION OF FEEDING DEVICE INTO STOMACH, PERCUTANEOUS APPROACH: ICD-10-PCS | Performed by: INTERNAL MEDICINE

## 2018-02-01 PROCEDURE — 3700000001 HC ADD 15 MINUTES (ANESTHESIA): Performed by: INTERNAL MEDICINE

## 2018-02-01 PROCEDURE — 2580000003 HC RX 258: Performed by: FAMILY MEDICINE

## 2018-02-01 PROCEDURE — 6370000000 HC RX 637 (ALT 250 FOR IP): Performed by: FAMILY MEDICINE

## 2018-02-01 PROCEDURE — 97110 THERAPEUTIC EXERCISES: CPT

## 2018-02-01 PROCEDURE — 6370000000 HC RX 637 (ALT 250 FOR IP): Performed by: INTERNAL MEDICINE

## 2018-02-01 PROCEDURE — 2060000000 HC ICU INTERMEDIATE R&B

## 2018-02-01 PROCEDURE — 43246 EGD PLACE GASTROSTOMY TUBE: CPT | Performed by: INTERNAL MEDICINE

## 2018-02-01 PROCEDURE — 97530 THERAPEUTIC ACTIVITIES: CPT

## 2018-02-01 PROCEDURE — 94640 AIRWAY INHALATION TREATMENT: CPT

## 2018-02-01 PROCEDURE — 6360000002 HC RX W HCPCS: Performed by: FAMILY MEDICINE

## 2018-02-01 PROCEDURE — 2500000003 HC RX 250 WO HCPCS: Performed by: ANESTHESIOLOGY

## 2018-02-01 PROCEDURE — 36415 COLL VENOUS BLD VENIPUNCTURE: CPT

## 2018-02-01 PROCEDURE — 7100000001 HC PACU RECOVERY - ADDTL 15 MIN: Performed by: INTERNAL MEDICINE

## 2018-02-01 PROCEDURE — 94761 N-INVAS EAR/PLS OXIMETRY MLT: CPT

## 2018-02-01 PROCEDURE — 6360000002 HC RX W HCPCS: Performed by: ANESTHESIOLOGY

## 2018-02-01 PROCEDURE — C9113 INJ PANTOPRAZOLE SODIUM, VIA: HCPCS | Performed by: FAMILY MEDICINE

## 2018-02-01 PROCEDURE — 2580000003 HC RX 258: Performed by: ANESTHESIOLOGY

## 2018-02-01 PROCEDURE — 84100 ASSAY OF PHOSPHORUS: CPT

## 2018-02-01 PROCEDURE — 80048 BASIC METABOLIC PNL TOTAL CA: CPT

## 2018-02-01 PROCEDURE — 3700000000 HC ANESTHESIA ATTENDED CARE: Performed by: INTERNAL MEDICINE

## 2018-02-01 PROCEDURE — C1889 IMPLANT/INSERT DEVICE, NOC: HCPCS | Performed by: INTERNAL MEDICINE

## 2018-02-01 PROCEDURE — 71045 X-RAY EXAM CHEST 1 VIEW: CPT

## 2018-02-01 PROCEDURE — 3609013300 HC EGD TUBE PLACEMENT: Performed by: INTERNAL MEDICINE

## 2018-02-01 PROCEDURE — 83735 ASSAY OF MAGNESIUM: CPT

## 2018-02-01 PROCEDURE — 2700000000 HC OXYGEN THERAPY PER DAY

## 2018-02-01 PROCEDURE — 7100000000 HC PACU RECOVERY - FIRST 15 MIN: Performed by: INTERNAL MEDICINE

## 2018-02-01 RX ORDER — FENTANYL CITRATE 50 UG/ML
25 INJECTION, SOLUTION INTRAMUSCULAR; INTRAVENOUS EVERY 5 MIN PRN
Status: DISCONTINUED | OUTPATIENT
Start: 2018-02-01 | End: 2018-02-01 | Stop reason: HOSPADM

## 2018-02-01 RX ORDER — ONDANSETRON 2 MG/ML
4 INJECTION INTRAMUSCULAR; INTRAVENOUS
Status: DISCONTINUED | OUTPATIENT
Start: 2018-02-01 | End: 2018-02-01 | Stop reason: HOSPADM

## 2018-02-01 RX ORDER — LIDOCAINE HYDROCHLORIDE 10 MG/ML
INJECTION, SOLUTION EPIDURAL; INFILTRATION; INTRACAUDAL; PERINEURAL PRN
Status: DISCONTINUED | OUTPATIENT
Start: 2018-02-01 | End: 2018-02-01 | Stop reason: HOSPADM

## 2018-02-01 RX ORDER — PROPOFOL 10 MG/ML
INJECTION, EMULSION INTRAVENOUS PRN
Status: DISCONTINUED | OUTPATIENT
Start: 2018-02-01 | End: 2018-02-01 | Stop reason: SDUPTHER

## 2018-02-01 RX ORDER — SODIUM CHLORIDE 9 MG/ML
INJECTION, SOLUTION INTRAVENOUS CONTINUOUS PRN
Status: DISCONTINUED | OUTPATIENT
Start: 2018-02-01 | End: 2018-02-01 | Stop reason: SDUPTHER

## 2018-02-01 RX ORDER — KETAMINE HYDROCHLORIDE 100 MG/ML
INJECTION, SOLUTION INTRAMUSCULAR; INTRAVENOUS PRN
Status: DISCONTINUED | OUTPATIENT
Start: 2018-02-01 | End: 2018-02-01 | Stop reason: SDUPTHER

## 2018-02-01 RX ADMIN — Medication 3.38 G: at 21:00

## 2018-02-01 RX ADMIN — PROPOFOL 20 MG: 10 INJECTION, EMULSION INTRAVENOUS at 16:28

## 2018-02-01 RX ADMIN — KETAMINE HYDROCHLORIDE 20 MG: 100 INJECTION INTRAMUSCULAR; INTRAVENOUS at 16:25

## 2018-02-01 RX ADMIN — CARBIDOPA AND LEVODOPA 1 TABLET: 25; 100 TABLET ORAL at 20:39

## 2018-02-01 RX ADMIN — METHYLPREDNISOLONE SODIUM SUCCINATE 40 MG: 40 INJECTION, POWDER, FOR SOLUTION INTRAMUSCULAR; INTRAVENOUS at 03:18

## 2018-02-01 RX ADMIN — PROPOFOL 20 MG: 10 INJECTION, EMULSION INTRAVENOUS at 16:33

## 2018-02-01 RX ADMIN — METHYLPREDNISOLONE SODIUM SUCCINATE 40 MG: 40 INJECTION, POWDER, FOR SOLUTION INTRAMUSCULAR; INTRAVENOUS at 18:05

## 2018-02-01 RX ADMIN — Medication 3.38 G: at 12:43

## 2018-02-01 RX ADMIN — IPRATROPIUM BROMIDE 0.5 MG: 0.5 SOLUTION RESPIRATORY (INHALATION) at 11:22

## 2018-02-01 RX ADMIN — Medication 10 ML: at 20:39

## 2018-02-01 RX ADMIN — SODIUM CHLORIDE: 9 INJECTION, SOLUTION INTRAVENOUS at 16:18

## 2018-02-01 RX ADMIN — DONEPEZIL HYDROCHLORIDE 10 MG: 10 TABLET, FILM COATED ORAL at 20:39

## 2018-02-01 RX ADMIN — LEVALBUTEROL 1.25 MG: 1.25 SOLUTION, CONCENTRATE RESPIRATORY (INHALATION) at 07:27

## 2018-02-01 RX ADMIN — CARBIDOPA AND LEVODOPA 1 TABLET: 25; 100 TABLET ORAL at 05:57

## 2018-02-01 RX ADMIN — IPRATROPIUM BROMIDE 0.5 MG: 0.5 SOLUTION RESPIRATORY (INHALATION) at 14:33

## 2018-02-01 RX ADMIN — LEVALBUTEROL 1.25 MG: 1.25 SOLUTION, CONCENTRATE RESPIRATORY (INHALATION) at 19:53

## 2018-02-01 RX ADMIN — FLUCONAZOLE 100 MG: 2 INJECTION INTRAVENOUS at 20:30

## 2018-02-01 RX ADMIN — IPRATROPIUM BROMIDE 0.5 MG: 0.5 SOLUTION RESPIRATORY (INHALATION) at 07:26

## 2018-02-01 RX ADMIN — ACETYLCYSTEINE 600 MG: 200 SOLUTION ORAL; RESPIRATORY (INHALATION) at 19:52

## 2018-02-01 RX ADMIN — Medication 3.38 G: at 04:37

## 2018-02-01 RX ADMIN — TAMSULOSIN HYDROCHLORIDE 0.4 MG: 0.4 CAPSULE ORAL at 20:39

## 2018-02-01 RX ADMIN — LEVALBUTEROL 1.25 MG: 1.25 SOLUTION, CONCENTRATE RESPIRATORY (INHALATION) at 11:23

## 2018-02-01 RX ADMIN — KETAMINE HYDROCHLORIDE 10 MG: 100 INJECTION INTRAMUSCULAR; INTRAVENOUS at 16:28

## 2018-02-01 RX ADMIN — METHYLPREDNISOLONE SODIUM SUCCINATE 40 MG: 40 INJECTION, POWDER, FOR SOLUTION INTRAMUSCULAR; INTRAVENOUS at 11:05

## 2018-02-01 RX ADMIN — PROPOFOL 20 MG: 10 INJECTION, EMULSION INTRAVENOUS at 16:37

## 2018-02-01 RX ADMIN — LEVALBUTEROL 1.25 MG: 1.25 SOLUTION, CONCENTRATE RESPIRATORY (INHALATION) at 14:30

## 2018-02-01 RX ADMIN — IPRATROPIUM BROMIDE 0.5 MG: 0.5 SOLUTION RESPIRATORY (INHALATION) at 19:52

## 2018-02-01 RX ADMIN — Medication 10 ML: at 17:10

## 2018-02-01 RX ADMIN — ACETYLCYSTEINE 600 MG: 200 SOLUTION ORAL; RESPIRATORY (INHALATION) at 07:26

## 2018-02-01 RX ADMIN — PANTOPRAZOLE SODIUM 40 MG: 40 INJECTION, POWDER, FOR SOLUTION INTRAVENOUS at 20:39

## 2018-02-01 ASSESSMENT — PAIN SCALES - GENERAL
PAINLEVEL_OUTOF10: 0

## 2018-02-01 ASSESSMENT — PULMONARY FUNCTION TESTS
PIF_VALUE: 0
PIF_VALUE: 1
PIF_VALUE: 0

## 2018-02-01 ASSESSMENT — PAIN SCALES - PAIN ASSESSMENT IN ADVANCED DEMENTIA (PAINAD)
BODYLANGUAGE: 0
NEGVOCALIZATION: 0
FACIALEXPRESSION: 0
TOTALSCORE: 0
CONSOLABILITY: 0
BREATHING: 0

## 2018-02-01 ASSESSMENT — PAIN SCALES - WONG BAKER: WONGBAKER_NUMERICALRESPONSE: 0

## 2018-02-01 NOTE — PROGRESS NOTES
Pulmonary Critical Care Progress Note  Cindy Yarbrough M.D. Patient seen for the follow up of Acute respiratory failure, aspiration pneumonia, acute exacerbation of COPD, AK I, dysphagia, Influenza A, Parkinson's dementia     Subjective:  He is lethargic, but his shortness of breath is much improved. He is on oxygen by nasal cannula at 3-4 L/m and tolerating it fairly well. He has mild shortness of breath without significant cough. Examination:  Vitals: BP (!) 125/51   Pulse 69   Temp 97.6 °F (36.4 °C) (Oral)   Resp 18   Ht 5' 2\" (1.575 m)   Wt 125 lb (56.7 kg)   SpO2 94%   BMI 22.86 kg/m²     General appearance: Lethargic today  Neck: No JVD  Lungs: No significant rhonchi, no wheeze, positive crackles  Heart: regular rate and rhythm, S1, S2 normal, no gallop  Abdomen: Soft, non tender, + BS  Extremities: no cyanosis or clubbing. No significant edema    LABs:  CBC:   Recent Labs      01/31/18   0433  02/01/18   0510   WBC  12.6*  9.7   HGB  10.1*  9.7*   HCT  30.7*  30.5*   PLT  301  304     BMP:   Recent Labs      01/31/18   1519  02/01/18   0510   NA  139  140   K  3.9  4.3   CO2  24  23   BUN  23  28*   CREATININE  1.07  1.18   LABGLOM  >60  59*   GLUCOSE  104*  133*     Radiology:  2/1/18      Impression:  · Acute Hypoxic respiratory failure  · Acute exacerbation of COPD  · Aspiration pneumonia  · Influenza A  · Dysphagia  · Parkinson's dementia  · Pulmonary edema    Recommendations:  · Oxygen by nasal cannula  · Continue IV Zithromax/Zosyn  · Patient was started on IV steroids, consider switching to oral once PEG is functional  · Xopenex and Ipratropium Q 4 hours and prn  · Plan for PEG tube this afternoon noted per primary team.   · IV fluids per nephrology  · Labs: CBC and BMP in am, monitor renal function  · Repeat x-ray chest in a.m.  · DVT prophylaxis with low molecular weight heparin  · Discussed with Dr. Apolonia CORTEZ for discharge planning from pulmonary stand point to LTAC in next 24-48

## 2018-02-01 NOTE — ANESTHESIA PRE PROCEDURE
3.375 g Intravenous Q8H Sarath Ochoa MD 12.5 mL/hr at 02/01/18 1243 3.375 g at 02/01/18 1243    ipratropium (ATROVENT) 0.02 % nebulizer solution 0.5 mg  0.5 mg Nebulization 4x daily Becca Boogie   0.5 mg at 02/01/18 1122    acetylcysteine (MUCOMYST) 20 % solution 600 mg  600 mg Inhalation TID Sarath Ochoa MD   600 mg at 02/01/18 4546    sodium chloride flush 0.9 % injection 10 mL  10 mL Intravenous 2 times per day Jayson Tay [place]   10 mL at 01/30/18 2116    sodium chloride flush 0.9 % injection 10 mL  10 mL Intravenous PRN Collin Xavier        acetaminophen (TYLENOL) 160 MG/5ML solution 650 mg  650 mg Oral Q4H PRN Becca Boogie   650 mg at 01/28/18 1257    donepezil (ARICEPT) tablet 10 mg  10 mg Oral Nightly Becca Boogie   Stopped at 01/30/18 2039       Allergies:  No Known Allergies    Problem List:    Patient Active Problem List   Diagnosis Code    Aspiration pneumonia ([facility] [address] [address] J69.0    Influenza A J10.1    Acute on chronic renal insufficiency N28.9, N18.9    Parkinson disease ([address] G20    Acute respiratory distress R06.03    Hypoxia R09.02    Acute hypernatremia E87.0    Hypokalemia E87.6    Cerebrovascular disease I67.9    Essential hypertension I10    Closed compression fracture of L1 lumbar vertebra ([address] S32.010A       Past Medical History:        Diagnosis Date    Arthritis     Cerebral artery occlusion with cerebral infarction ([address]     Chronic kidney disease     COPD (chronic obstructive pulmonary disease) ([address]     Hyperlipidemia     Hypertension     Pneumonia     Raynaud's disease        Past Surgical History:        Procedure Laterality Date    CARDIAC SURGERY      CABG   9 years ago    CAROTID ENDARTERECTOMY Left     CAROTID-SUBCLAVIAN BYPASS GRAFT         Social History:    Social History   Substance Use Topics    Smoking status: Former Smoker    Smokeless tobacco: Never Used    Alcohol use No                                Counseling given: Not Answered      Vital Signs (Current):   Vitals:    02/01/18 0644 02/01/18 0729 02/01/18 0852 02/01/18 1113   BP: 134/61   (!) 125/51   Pulse: 69   69   Resp: 18 18   Temp: 97.9 °F (36.6 °C)   97.6 °F (36.4 °C)   TempSrc: Oral   Oral   SpO2: 92% 93% 95% 94%   Weight:       Height:                                                  BP Readings from Last 3 Encounters:   02/01/18 (!) 125/51       NPO Status:                                                                                 BMI:   Wt Readings from Last 3 Encounters:   01/22/18 125 lb (56.7 kg)     Body mass index is 22.86 kg/m².     CBC:   Lab Results   Component Value Date    WBC 9.7 02/01/2018    RBC 3.39 02/01/2018    HGB 9.7 02/01/2018    HCT 30.5 02/01/2018    MCV 90.1 02/01/2018    RDW 13.0 02/01/2018     02/01/2018       CMP:   Lab Results   Component Value Date     02/01/2018    K 4.3 02/01/2018     02/01/2018    CO2 23 02/01/2018    BUN 28 02/01/2018    CREATININE 1.18 02/01/2018    GFRAA >60 02/01/2018    LABGLOM 59 02/01/2018    GLUCOSE 133 02/01/2018    PROT 4.8 01/28/2018    CALCIUM 8.0 02/01/2018    BILITOT 0.37 01/28/2018    ALKPHOS 61 01/28/2018    AST 20 01/28/2018    ALT 31 01/28/2018       POC Tests:   Recent Labs      01/30/18   0717   POCGLU  79       Coags:   Lab Results   Component Value Date    APTT 36.4 01/24/2018       HCG (If Applicable): No results found for: PREGTESTUR, PREGSERUM, HCG, HCGQUANT     ABGs: No results found for: PHART, PO2ART, YQA5DYF, ZQF3QRZ, BEART, X2JIUVFW     Type & Screen (If Applicable):  No results found for: LABABO, LABRH    Anesthesia Evaluation   no history of anesthetic complications:   Airway: Mallampati: II  TM distance: >3 FB   Neck ROM: full  Mouth opening: > = 3 FB Dental:    (+) edentulous      Pulmonary: breath sounds clear to auscultation  (+) pneumonia:                             Cardiovascular:    (+) hypertension:, CABG/stent:, SHARPE:, hyperlipidemia    (-)

## 2018-02-01 NOTE — PROGRESS NOTES
Physical Therapy  Facility/Department: [facility] PROGRESSIVE CARE  Daily Treatment Note  NAME: Rober Dexter  : 1935  MRN: [id number]  Discharge Recommendation:   [address]    Date of Service: 2018  RN reports patient is medically stable for therapy treatment this date. Chart reviewed prior to treatment and patient is agreeable for therapy. Patient Diagnosis(es):   Patient Active Problem List    Diagnosis Date Noted    Influenza A 2018     Priority: High    Acute on chronic renal insufficiency 2018     Priority: High    Acute respiratory distress 2018    Hypoxia 2018    Acute hypernatremia 2018     Class: Acute    Hypokalemia 2018    Cerebrovascular disease 2018    Essential hypertension 2018    Closed compression fracture of L1 lumbar vertebra ([address] 2018    Aspiration pneumonia ([address] 2018    Parkinson disease ([address] 2018       Past Medical History:   Diagnosis Date    Arthritis     Cerebral artery occlusion with cerebral infarction ([address]     Chronic kidney disease     COPD (chronic obstructive pulmonary disease) ([address]     Hyperlipidemia     Hypertension     Pneumonia     Raynaud's disease      Past Surgical History:   Procedure Laterality Date    CARDIAC SURGERY      CABG   9 years ago   Rolando Banerjee CAROTID ENDARTERECTOMY Left     CAROTID-SUBCLAVIAN BYPASS GRAFT         Restrictions  Restrictions/Precautions  Restrictions/Precautions: General Precautions, Fall Risk, Isolation  Required Braces or Orthoses?: No  Position Activity Restriction  Other position/activity restrictions: up with assist, droplet precautions, DNR-CCA   Subjective   General  Chart Reviewed: Yes  Additional Pertinent Hx: CVA, frequent falls  Response To Previous Treatment: Patient with no complaints from previous session.   Subjective  Subjective: Patient agreeable to PT  General Comment  Comments: Ling BARBOZA Guess PT  Pain Screening  Patient Currently in Pain: Yes  Vital Signs  Patient Currently in Pain: Yes  Oxygen Therapy  SpO2: 95 %  Pulse Oximeter Device Mode: Continuous  Pulse Oximeter Device Location: Left; Foot  O2 Device: Venturi mask       Orientation  Orientation  Overall Orientation Status: Impaired  Orientation Level: Oriented to person;Oriented to place  Objective   Bed mobility  Rolling to Left: Maximum assistance  Supine to Sit: Maximum assistance (of 2)  Sit to Supine: Maximum assistance (of 2)  Scooting: Maximal assistance (of 2 & use of slide sheet)  Comment: Pt sat EOB for 25 minutes, not as alert with intermittent eyes open, rubbing at eyes & removing venti mask  Transfers  Sit to Stand: Unable to assess        Balance  Sitting - Static: Fair;-  Sitting - Dynamic: Poor;+  Comments: had strong lean to left side, placed 2 pillow support under LUE & pt then better able to maintain static sitting  Exercises  AAROM of LE's while seated, 3 sets of 5                       Assessment   Body structures, Functions, Activity limitations: Decreased functional mobility ; Decreased strength;Decreased endurance;Decreased balance    Patient still requires skilled PT in [address] [facility] setting to increase independence with functional mobility, balance, safety awareness & activity tolerance, & enable pt to return home safely. Prognosis: Good  Patient Education: functional mobility & ex's  REQUIRES PT FOLLOW UP: Yes  Activity Tolerance  Activity Tolerance: Patient limited by fatigue;Patient limited by endurance       Discharge Recommendations:  Subacute/Skilled Nursing Facility           Goals  Short term goals  Time Frame for Short term goals: 12 visits  Short term goal 1: Inc bed mobility to CGA or better;   Short term goal 2: Transfers to mod assist  Short term goal 3: Patient will amb 10 feet with RW and mod assist  Short term goal 4: Pt able to tolerate 30 min of activity to include 15-20 reps of ex & functional mobility to facilitate better activity tolerance;   Short term goal 5: Pt to tolerate sitting EOB 20 minutes to increase trunk control & enable pt to transfer OOB & initiate ambulation;  Patient Goals   Patient goals : able to return home to living with my wife    Plan    Plan  Times per week: 1-2x/D, 6-7D/week  Specific instructions for Next Treatment: try cy haynes  Current Treatment Recommendations: Strengthening, Balance Training, Functional Mobility Training, Home Exercise Program, Safety Education & Training, Patient/Caregiver Education & Training  Safety Devices  Type of devices: Bed alarm in place, Call light within reach, Left in bed, Patient at risk for falls, Gait belt, Nurse notified  Restraints  Initially in place: No     Therapy Time   Individual Concurrent Group Co-treatment   Time In  0852         Time Out  0935         Minutes  8901  [facility],

## 2018-02-01 NOTE — PROGRESS NOTES
pantoprazole (PROTONIX) injection 40 mg Nightly   And    sodium chloride (PF) 0.9 % injection 10 mL Daily   amLODIPine (NORVASC) tablet 5 mg BID   fluconazole (DIFLUCAN) 100 mg in 0.9 % NaCl 50 mL premix IVPB Q24H   glucose (GLUTOSE) 40 % oral gel 15 g PRN   dextrose 50 % solution 12.5 g PRN   glucagon (rDNA) injection 1 mg PRN   dextrose 5 % solution PRN   ondansetron (ZOFRAN) injection 4 mg Q6H PRN   acetaminophen (TYLENOL) suppository 650 mg Q4H PRN   piperacillin-tazobactam (ZOSYN) 3.375 g in dextrose 50 mL IVPB extended infusion (premix) Q8H   ipratropium (ATROVENT) 0.02 % nebulizer solution 0.5 mg 4x daily   acetylcysteine (MUCOMYST) 20 % solution 600 mg TID   sodium chloride flush 0.9 % injection 10 mL 2 times per day   sodium chloride flush 0.9 % injection 10 mL PRN   acetaminophen (TYLENOL) 160 MG/5ML solution 650 mg Q4H PRN   donepezil (ARICEPT) tablet 10 mg Nightly       Labs:    CBC:  Recent Labs      01/31/18   0433  02/01/18   0510   WBC  12.6*  9.7   RBC  3.44*  3.39*   HGB  10.1*  9.7*   HCT  30.7*  30.5*   MCV  89.3  90.1   MCH  29.5  28.8   MCHC  33.0  32.0   RDW  12.6  13.0   PLT  301  304   MPV  NOT REPORTED  NOT REPORTED     Chemistry:  Recent Labs      01/30/18   0633  01/31/18   0433  01/31/18   1519  02/01/18   0510   NA  143  138  139  140   K  3.3*  3.5*  3.9  4.3   CL  108*  104  106  104   CO2  25  24  24  23   GLUCOSE  103*  107*  104*  133*   BUN  27*  24*  23  28*   CREATININE  0.94  0.94  1.07  1.18   MG  2.1  2.0   --   2.2   ANIONGAP  10  10  9  13   LABGLOM  >60  >60  >60  59*   GFRAA  >60  >60  >60  >60   CALCIUM  7.6*  7.8*  7.6*  8.0*   PHOS  2.7  2.7   --   4.5     No results for input(s): PROT, LABALBU, LABA1C, G7ABEBG, D4OGROY, FT4, TSH, AST, ALT, LDH, GGT, ALKPHOS, BILITOT, BILIDIR, AMMONIA, AMYLASE, LIPASE, LACTATE, CHOL, HDL, LDLCHOLESTEROL, CHOLHDLRATIO, TRIG, VLDL, PHENYTOIN, PHENYF in the last 72 hours.   Albumin:   Lab Results   Component Value Date    LABALBU 2.0

## 2018-02-01 NOTE — PROGRESS NOTES
1700: Dr. Darien Andrews at bedside. He stated pt's Peg tube is okay to be used tonight for water and medications. Tomorrow the Peg may be used for feedings per Dr. Darien Andrews. Transfer order received to take the pt back to his room on the Progressive Unit. Please see orders. 1710: Dr. Brandyn Dawn at bedside assessing pt. Pt responds to commands. Pt's blood pressure is 136/50 and oxygen saturation level is 93% on 6 Liters per nasal cannula. Dr. Brandyn Dawn stated pt is okay to be transferred to the floor at this time.

## 2018-02-01 NOTE — PROGRESS NOTES
Transferred pt to [facility] 1001-2 per bed with cardiac monitor. Pt wearing face mask while being transported. Pt alert and following commands. Report given to TAMRA Castaneda.

## 2018-02-01 NOTE — OP NOTE
[facility] ENDOSCOPY     REFERRING PHYSICIAN: No ref. provider found     PRIMARY CARE PROVIDER: Ernestine Munoz MD    ATTENDING PHYSICIAN: Yeni Bell MD     HISTORY: Mr. Kaci Mooney is a 80 y.o. male who presents to the [place] [address] Endoscopy unit for upper endoscopy. The patient's clinical history is remarkable for aspiration. He is currently medically stable and appropriate for the planned procedure. PREOPERATIVE DIAGNOSIS: aspiration. PROCEDURES:   1) Transoral Upper Endoscopy, PEG tube placement. POSTOPERATIVE DIAGNOSIS:   Aspiration     MEDICATIONS:   MAC per anesthesia    INSTRUMENT: Olympus GIF-H180 flexible Gastroscope. PREPARATION: The nature and character of the procedure as well as risks, benefits, and alternatives were discussed with the patient and informed consent was obtained. Complications were said to include, but were not limited to: medication allergy, medication reaction, cardiovascular and respiratory problems, bleeding, perforation, infection, and/or missed diagnosis. Following arrival in the endoscopy room, the patient was placed in the left lateral decubitus position and final time-out accomplished in the presence of the nursing staff. Baseline vital signs were obtained and reviewed, and IV sedation was subsequently initiated. FINDINGS:   Esophagus: The esophagus was inspected to the Z-line. The endoscopic exam showed no pathology. Stomach: The stomach was inspected in both forward and retroflex fashion and was appropriately distensible. The cardia, fundus, incisura, antrum and pylorus were identified via direct visualization. The endoscopic exam showed mild scattered erythema. Duodenum: The proximal small bowel was inspected through the bulb, sweep, and second portion of the duodenum. The endoscopic exam showed no pathology. The upper abdomen was sterilized in standard fashion.  Area adequate for PEG placement was determined by 1:1 ballottement and direct transillumination. Local anesthesia was achieved by injecting 5 ml of 1% lidocaine. Further the needle was advanced through the abdominal wall with negative pressure applied to the syringe. The needle tip was visualized in the gastric lumen. Further trocar was advanced through into the gastric lumen. Guidewire was pulled through using a cold snare. Subsequently a [address] PEG tube was placed without complications. The patient received antibiotics before the procedure. RECOMMENDATIONS:   1) Transfer back to the floor for further management as per primary care team.    2) Ok to use PEG for medication and water flushes today. Start tube feeding tomorrow. Nutrition consultation. 3) PEG check in 1 week.         Rita Gilford MD Renold Close

## 2018-02-02 ENCOUNTER — APPOINTMENT (OUTPATIENT)
Dept: GENERAL RADIOLOGY | Age: 83
DRG: 193 | End: 2018-02-02
Payer: MEDICARE

## 2018-02-02 LAB
ABSOLUTE EOS #: 0 K/UL (ref 0–0.4)
ABSOLUTE IMMATURE GRANULOCYTE: ABNORMAL K/UL (ref 0–0.3)
ABSOLUTE LYMPH #: 0.3 K/UL (ref 1–4.8)
ABSOLUTE MONO #: 0.3 K/UL (ref 0.2–0.8)
ANION GAP SERPL CALCULATED.3IONS-SCNC: 11 MMOL/L (ref 9–17)
BASOPHILS # BLD: 0 % (ref 0–2)
BASOPHILS ABSOLUTE: 0 K/UL (ref 0–0.2)
BUN BLDV-MCNC: 38 MG/DL (ref 8–23)
BUN/CREAT BLD: 32 (ref 9–20)
CALCIUM SERPL-MCNC: 8 MG/DL (ref 8.6–10.4)
CHLORIDE BLD-SCNC: 109 MMOL/L (ref 98–107)
CO2: 22 MMOL/L (ref 20–31)
CREAT SERPL-MCNC: 1.19 MG/DL (ref 0.7–1.2)
DIFFERENTIAL TYPE: ABNORMAL
EOSINOPHILS RELATIVE PERCENT: 0 % (ref 1–4)
GFR AFRICAN AMERICAN: >60 ML/MIN
GFR NON-AFRICAN AMERICAN: 58 ML/MIN
GFR SERPL CREATININE-BSD FRML MDRD: ABNORMAL ML/MIN/{1.73_M2}
GFR SERPL CREATININE-BSD FRML MDRD: ABNORMAL ML/MIN/{1.73_M2}
GLUCOSE BLD-MCNC: 146 MG/DL (ref 75–110)
GLUCOSE BLD-MCNC: 156 MG/DL (ref 70–99)
HCT VFR BLD CALC: 26.7 % (ref 41–53)
HEMOGLOBIN: 8.8 G/DL (ref 13.5–17.5)
IMMATURE GRANULOCYTES: ABNORMAL %
LYMPHOCYTES # BLD: 3 % (ref 24–44)
MAGNESIUM: 2.5 MG/DL (ref 1.6–2.6)
MCH RBC QN AUTO: 29.9 PG (ref 26–34)
MCHC RBC AUTO-ENTMCNC: 32.9 G/DL (ref 31–37)
MCV RBC AUTO: 91.1 FL (ref 80–100)
MONOCYTES # BLD: 3 % (ref 1–7)
NRBC AUTOMATED: ABNORMAL PER 100 WBC
PDW BLD-RTO: 13.3 % (ref 11.5–14.5)
PHOSPHORUS: 4 MG/DL (ref 2.5–4.5)
PLATELET # BLD: 306 K/UL (ref 130–400)
PLATELET ESTIMATE: ABNORMAL
PMV BLD AUTO: 8.5 FL (ref 6–12)
POTASSIUM SERPL-SCNC: 3.4 MMOL/L (ref 3.7–5.3)
RBC # BLD: 2.93 M/UL (ref 4.5–5.9)
RBC # BLD: ABNORMAL 10*6/UL
SEG NEUTROPHILS: 94 % (ref 36–66)
SEGMENTED NEUTROPHILS ABSOLUTE COUNT: 9.1 K/UL (ref 1.8–7.7)
SODIUM BLD-SCNC: 142 MMOL/L (ref 135–144)
WBC # BLD: 9.6 K/UL (ref 3.5–11)
WBC # BLD: ABNORMAL 10*3/UL

## 2018-02-02 PROCEDURE — 2700000000 HC OXYGEN THERAPY PER DAY

## 2018-02-02 PROCEDURE — 36415 COLL VENOUS BLD VENIPUNCTURE: CPT

## 2018-02-02 PROCEDURE — 71045 X-RAY EXAM CHEST 1 VIEW: CPT

## 2018-02-02 PROCEDURE — 2580000003 HC RX 258: Performed by: FAMILY MEDICINE

## 2018-02-02 PROCEDURE — 6370000000 HC RX 637 (ALT 250 FOR IP): Performed by: FAMILY MEDICINE

## 2018-02-02 PROCEDURE — 85025 COMPLETE CBC W/AUTO DIFF WBC: CPT

## 2018-02-02 PROCEDURE — 80048 BASIC METABOLIC PNL TOTAL CA: CPT

## 2018-02-02 PROCEDURE — C9113 INJ PANTOPRAZOLE SODIUM, VIA: HCPCS | Performed by: FAMILY MEDICINE

## 2018-02-02 PROCEDURE — 97110 THERAPEUTIC EXERCISES: CPT

## 2018-02-02 PROCEDURE — 6360000002 HC RX W HCPCS: Performed by: FAMILY MEDICINE

## 2018-02-02 PROCEDURE — 94761 N-INVAS EAR/PLS OXIMETRY MLT: CPT

## 2018-02-02 PROCEDURE — 84100 ASSAY OF PHOSPHORUS: CPT

## 2018-02-02 PROCEDURE — 94640 AIRWAY INHALATION TREATMENT: CPT

## 2018-02-02 PROCEDURE — 2500000003 HC RX 250 WO HCPCS: Performed by: INTERNAL MEDICINE

## 2018-02-02 PROCEDURE — 99232 SBSQ HOSP IP/OBS MODERATE 35: CPT | Performed by: INTERNAL MEDICINE

## 2018-02-02 PROCEDURE — 6360000002 HC RX W HCPCS: Performed by: INTERNAL MEDICINE

## 2018-02-02 PROCEDURE — 2060000000 HC ICU INTERMEDIATE R&B

## 2018-02-02 PROCEDURE — 82947 ASSAY GLUCOSE BLOOD QUANT: CPT

## 2018-02-02 PROCEDURE — 83735 ASSAY OF MAGNESIUM: CPT

## 2018-02-02 PROCEDURE — 2580000003 HC RX 258: Performed by: INTERNAL MEDICINE

## 2018-02-02 RX ORDER — DOXAZOSIN MESYLATE 1 MG/1
2 TABLET ORAL NIGHTLY
Status: DISCONTINUED | OUTPATIENT
Start: 2018-02-02 | End: 2018-02-05 | Stop reason: HOSPADM

## 2018-02-02 RX ORDER — AMLODIPINE BESYLATE 5 MG/1
5 TABLET ORAL 2 TIMES DAILY
Status: DISCONTINUED | OUTPATIENT
Start: 2018-02-02 | End: 2018-02-02

## 2018-02-02 RX ORDER — POLYETHYLENE GLYCOL 3350 17 G/17G
17 POWDER, FOR SOLUTION ORAL DAILY PRN
Status: DISCONTINUED | OUTPATIENT
Start: 2018-02-02 | End: 2018-02-05 | Stop reason: HOSPADM

## 2018-02-02 RX ORDER — ACETAMINOPHEN 160 MG/5ML
650 SOLUTION ORAL EVERY 4 HOURS PRN
Status: DISCONTINUED | OUTPATIENT
Start: 2018-02-02 | End: 2018-02-05 | Stop reason: HOSPADM

## 2018-02-02 RX ORDER — AMLODIPINE BESYLATE 5 MG/1
5 TABLET ORAL NIGHTLY
Status: DISCONTINUED | OUTPATIENT
Start: 2018-02-02 | End: 2018-02-02

## 2018-02-02 RX ORDER — DONEPEZIL HYDROCHLORIDE 10 MG/1
10 TABLET, FILM COATED ORAL NIGHTLY
Status: DISCONTINUED | OUTPATIENT
Start: 2018-02-02 | End: 2018-02-05 | Stop reason: HOSPADM

## 2018-02-02 RX ORDER — METHYLPREDNISOLONE SODIUM SUCCINATE 40 MG/ML
20 INJECTION, POWDER, LYOPHILIZED, FOR SOLUTION INTRAMUSCULAR; INTRAVENOUS EVERY 8 HOURS
Status: DISCONTINUED | OUTPATIENT
Start: 2018-02-02 | End: 2018-02-05 | Stop reason: HOSPADM

## 2018-02-02 RX ADMIN — CARBIDOPA AND LEVODOPA 1 TABLET: 25; 100 TABLET ORAL at 12:42

## 2018-02-02 RX ADMIN — IPRATROPIUM BROMIDE 0.5 MG: 0.5 SOLUTION RESPIRATORY (INHALATION) at 15:27

## 2018-02-02 RX ADMIN — METHYLPREDNISOLONE SODIUM SUCCINATE 40 MG: 40 INJECTION, POWDER, FOR SOLUTION INTRAMUSCULAR; INTRAVENOUS at 03:00

## 2018-02-02 RX ADMIN — POTASSIUM CHLORIDE: 2 INJECTION, SOLUTION, CONCENTRATE INTRAVENOUS at 13:27

## 2018-02-02 RX ADMIN — LEVALBUTEROL 1.25 MG: 1.25 SOLUTION, CONCENTRATE RESPIRATORY (INHALATION) at 15:27

## 2018-02-02 RX ADMIN — POTASSIUM CHLORIDE: 2 INJECTION, SOLUTION, CONCENTRATE INTRAVENOUS at 12:22

## 2018-02-02 RX ADMIN — DONEPEZIL HYDROCHLORIDE 10 MG: 10 TABLET, FILM COATED ORAL at 21:37

## 2018-02-02 RX ADMIN — CARBIDOPA AND LEVODOPA 1 TABLET: 25; 100 TABLET ORAL at 21:37

## 2018-02-02 RX ADMIN — ACETYLCYSTEINE 600 MG: 200 SOLUTION ORAL; RESPIRATORY (INHALATION) at 07:02

## 2018-02-02 RX ADMIN — ACETAMINOPHEN 650 MG: 325 SOLUTION ORAL at 10:06

## 2018-02-02 RX ADMIN — METHYLPREDNISOLONE SODIUM SUCCINATE 20 MG: 40 INJECTION, POWDER, FOR SOLUTION INTRAMUSCULAR; INTRAVENOUS at 12:24

## 2018-02-02 RX ADMIN — IPRATROPIUM BROMIDE 0.5 MG: 0.5 SOLUTION RESPIRATORY (INHALATION) at 10:48

## 2018-02-02 RX ADMIN — METHYLPREDNISOLONE SODIUM SUCCINATE 20 MG: 40 INJECTION, POWDER, FOR SOLUTION INTRAMUSCULAR; INTRAVENOUS at 17:54

## 2018-02-02 RX ADMIN — ACETYLCYSTEINE 600 MG: 200 SOLUTION ORAL; RESPIRATORY (INHALATION) at 19:27

## 2018-02-02 RX ADMIN — POTASSIUM CHLORIDE: 2 INJECTION, SOLUTION, CONCENTRATE INTRAVENOUS at 11:19

## 2018-02-02 RX ADMIN — POTASSIUM CHLORIDE: 2 INJECTION, SOLUTION, CONCENTRATE INTRAVENOUS at 09:58

## 2018-02-02 RX ADMIN — LEVALBUTEROL 1.25 MG: 1.25 SOLUTION, CONCENTRATE RESPIRATORY (INHALATION) at 07:02

## 2018-02-02 RX ADMIN — PANTOPRAZOLE SODIUM 40 MG: 40 INJECTION, POWDER, FOR SOLUTION INTRAVENOUS at 21:37

## 2018-02-02 RX ADMIN — Medication 10 ML: at 21:52

## 2018-02-02 RX ADMIN — Medication 10 ML: at 21:51

## 2018-02-02 RX ADMIN — LEVALBUTEROL 1.25 MG: 1.25 SOLUTION, CONCENTRATE RESPIRATORY (INHALATION) at 10:48

## 2018-02-02 RX ADMIN — Medication 3.38 G: at 21:37

## 2018-02-02 RX ADMIN — Medication 3.38 G: at 12:42

## 2018-02-02 RX ADMIN — IPRATROPIUM BROMIDE 0.5 MG: 0.5 SOLUTION RESPIRATORY (INHALATION) at 07:02

## 2018-02-02 RX ADMIN — CARBIDOPA AND LEVODOPA 1 TABLET: 25; 100 TABLET ORAL at 17:55

## 2018-02-02 RX ADMIN — IPRATROPIUM BROMIDE 0.5 MG: 0.5 SOLUTION RESPIRATORY (INHALATION) at 19:27

## 2018-02-02 RX ADMIN — Medication 3.38 G: at 04:30

## 2018-02-02 RX ADMIN — LEVALBUTEROL 1.25 MG: 1.25 SOLUTION, CONCENTRATE RESPIRATORY (INHALATION) at 19:26

## 2018-02-02 RX ADMIN — ACETYLCYSTEINE 600 MG: 200 SOLUTION ORAL; RESPIRATORY (INHALATION) at 15:28

## 2018-02-02 RX ADMIN — DOXAZOSIN 2 MG: 1 TABLET ORAL at 21:37

## 2018-02-02 ASSESSMENT — PAIN SCALES - GENERAL
PAINLEVEL_OUTOF10: 0
PAINLEVEL_OUTOF10: 2

## 2018-02-02 NOTE — PLAN OF CARE
Problem: Nutrition  Goal: Optimal nutrition therapy  Nutrition Problem: Swallowing difficulty  Intervention: Food and/or Nutrient Delivery: Continue NPO, Start Tube Feeding  Nutritional Goals: EN intake to meet >90% of estimated kcal/protein needs, with good GI folerance, glycemic control, management of renal indices  Outcome: Ongoing

## 2018-02-02 NOTE — PROGRESS NOTES
Nurse spoke with Infection Disease Nurse regarding droplet isolation, patient should remain in isolation while at [address] d/t patient still having some respiraotry symptoms but not required for patient to stay in isolation when patient leaves the hospital because they have completed treatment for the flu.

## 2018-02-02 NOTE — PROGRESS NOTES
Pulmonary Critical Care Progress Note  Sky Moore CNP / Dr. Marcia Max M.D. Patient seen for the follow up of Respiratory failure, COPD exacerbation, pneumonia, influenza A    Subjective:  He had PEG tube placed and tolerated procedure well. His wife is at the bedside. He is nonverbal.  No events overnight. Examination:  Vitals: /83   Pulse 190   Temp 97.5 °F (36.4 °C) (Axillary)   Resp (!) 32   Ht 5' 2\" (1.575 m)   Wt 125 lb (56.7 kg)   SpO2 97%   BMI 22.86 kg/m²     General appearance: No signs of distress, nursing and wife at bedside  Neck: No JVD  Lungs: + Rhonchi, no wheeze  Heart: regular rate and rhythm, S1, S2 normal, no gallop  Abdomen: Soft, non tender, + BS  Extremities: no cyanosis or clubbing. No significant edema    LABs:  CBC:   Recent Labs      02/01/18   0510  02/02/18   0611   WBC  9.7  9.6   HGB  9.7*  8.8*   HCT  30.5*  26.7*   PLT  304  306     BMP:   Recent Labs      02/01/18   0510  02/02/18   0611   NA  140  142   K  4.3  3.4*   CO2  23  22   BUN  28*  38*   CREATININE  1.18  1.19   LABGLOM  59*  58*   GLUCOSE  133*  156*     Radiology:  2/2/18      Impression:  · Acute Hypoxic respiratory failure  · Acute exacerbation of COPD  · Aspiration pneumonia  · Influenza A  · Pulmonary edema  · Dysphasia  · Parkinson's dementia    Recommendations:  · 2 liters/min via nasal cannula  · Continue IV Zithromax/Zosyn  · IV solu-medrol, consider switching to oral once PEG is functional  · Xopenex and Ipratropium Q 4 hours and prn  · PEG tube placement yesterday  · IV fluids per nephrology  · DVT prophylaxis with low molecular weight heparin  · OK for discharge planning from pulmonary standpoint to [facility]    Electronically signed by     Cara Schroeder CNP on 2/2/2018 at 10:01 AM  Patient was seen under the supervision of Dr. Keiry Bustamante and Sleep Medicine,    Patient seen and evaluated by me. No significant overnight events.   He is breathing okay okay

## 2018-02-02 NOTE — PROGRESS NOTES
Physical Therapy  DATE: 2018    NAME: Valentin Villafana  MRN: [id number]   : 1935    Entered room to find pt. Up to chair with MAXI MOVE sling beneath him in recliner. Reclined fully with wife by his side. Able to discuss with wife pt's former physical abilities. Apparently pt. Was ambulatory with RW prior to admission. Sat pt. Upright in chair. AAROM for seated UE/ LE therex. Reaching activities [address] for UE. D1 PNF pattern bilat. UEs with assist.  Pt. With difficulty with finger extension/ opening hands due to weakness. AROM thumbs and thumb to finger opposition AROM. Coordination patterns with hands/UEs. Did well with these. Follows commands very well. With assist at UEs, had pt. Sit forward in chair with 5 sec. Holds. Very taxing for pt., along with all other ex. Pt. Very fatigued and breathing heavy after each ex. Rest breaks given t/o. Returned pt. To reclined position at his request.  Asleep within one min.       Time: [id number]  Deonna Adrian, PT

## 2018-02-02 NOTE — PROGRESS NOTES
Value Date    PROTEINU NEGATIVE 01/31/2018     Radiology:  Kidney US:  1.  No hydronephrosis.       2.  Acoustic shadowing in the expected location of the gallbladder suggesting   contracted gallbladder with multiple calculi.  Recommend dedicated follow-up   gallbladder ultrasound. 2D ECHO:  Technically difficult study. Left ventricle is normal in size and wall thickness. Global left ventricular systolic function appears normal with estimated EF  of 55% but difficult to assess due to limited visualization. All segments not well visualized. Cannot rule-out abnormal segmental wall  motion. No significant valvular regurgitation or stenosis seen. No pericardial effusion is seen. Assessment:  1. Acute kidney injury: likely secondary to toxic acute tubular necrosis, non oliguric  2. Hypertension, controlled. 3. Respiratory failure currently on nasal  BiPAP. 4. Influenza A.  5. Multifocal pneumonia, aspiration pneumonia. 6. Parkinson's disease. 7. Ischemic heart disease with prior CABG echocardiogram demonstrating preserved EF. 8.           Hypernatremia. 9.           Hypokalemia. Plan:  Start free water per PEG tube  Potassium replaced  Renal function stable  BUN increased secondary to steroids  Continue norvasc, continue to hold ACE-I. Avoid hypotension, nephrotoxic drugs, Fleets enema and IV contrast exposure. Follow up labs ordered. We'll continue to follow. Please do not hesitate to call with questions.   Electronically signed by Sparkle Toney MD on 02/02/18 12:29 PM

## 2018-02-03 LAB
ANION GAP SERPL CALCULATED.3IONS-SCNC: 12 MMOL/L (ref 9–17)
BUN BLDV-MCNC: 40 MG/DL (ref 8–23)
BUN/CREAT BLD: 34 (ref 9–20)
CALCIUM SERPL-MCNC: 8.3 MG/DL (ref 8.6–10.4)
CHLORIDE BLD-SCNC: 111 MMOL/L (ref 98–107)
CO2: 23 MMOL/L (ref 20–31)
CREAT SERPL-MCNC: 1.17 MG/DL (ref 0.7–1.2)
FERRITIN: 370 UG/L (ref 30–400)
GFR AFRICAN AMERICAN: >60 ML/MIN
GFR NON-AFRICAN AMERICAN: 60 ML/MIN
GFR SERPL CREATININE-BSD FRML MDRD: ABNORMAL ML/MIN/{1.73_M2}
GFR SERPL CREATININE-BSD FRML MDRD: ABNORMAL ML/MIN/{1.73_M2}
GLUCOSE BLD-MCNC: 145 MG/DL (ref 70–99)
GLUCOSE BLD-MCNC: 155 MG/DL (ref 75–110)
GLUCOSE BLD-MCNC: 160 MG/DL (ref 75–110)
GLUCOSE BLD-MCNC: 163 MG/DL (ref 75–110)
GLUCOSE BLD-MCNC: 179 MG/DL (ref 75–110)
GLUCOSE BLD-MCNC: 188 MG/DL (ref 75–110)
HCT VFR BLD CALC: 31.8 % (ref 41–53)
HEMOGLOBIN: 10.2 G/DL (ref 13.5–17.5)
IRON SATURATION: 25 % (ref 20–55)
IRON: 37 UG/DL (ref 59–158)
MAGNESIUM: 2.6 MG/DL (ref 1.6–2.6)
MCH RBC QN AUTO: 29.5 PG (ref 26–34)
MCHC RBC AUTO-ENTMCNC: 32 G/DL (ref 31–37)
MCV RBC AUTO: 92.3 FL (ref 80–100)
NRBC AUTOMATED: ABNORMAL PER 100 WBC
PDW BLD-RTO: 14.2 % (ref 11.5–14.5)
PHOSPHORUS: 2.8 MG/DL (ref 2.5–4.5)
PLATELET # BLD: 323 K/UL (ref 130–400)
PMV BLD AUTO: 8.2 FL (ref 6–12)
POTASSIUM SERPL-SCNC: 3.7 MMOL/L (ref 3.7–5.3)
RBC # BLD: 3.44 M/UL (ref 4.5–5.9)
SODIUM BLD-SCNC: 146 MMOL/L (ref 135–144)
TOTAL IRON BINDING CAPACITY: 146 UG/DL (ref 250–450)
UNSATURATED IRON BINDING CAPACITY: 109 UG/DL (ref 112–347)
VITAMIN B-12: 1886 PG/ML (ref 232–1245)
VITAMIN D 25-HYDROXY: 19.1 NG/ML (ref 30–100)
WBC # BLD: 8.9 K/UL (ref 3.5–11)

## 2018-02-03 PROCEDURE — 6370000000 HC RX 637 (ALT 250 FOR IP): Performed by: FAMILY MEDICINE

## 2018-02-03 PROCEDURE — 2700000000 HC OXYGEN THERAPY PER DAY

## 2018-02-03 PROCEDURE — 2500000003 HC RX 250 WO HCPCS: Performed by: INTERNAL MEDICINE

## 2018-02-03 PROCEDURE — 82728 ASSAY OF FERRITIN: CPT

## 2018-02-03 PROCEDURE — 6360000002 HC RX W HCPCS: Performed by: FAMILY MEDICINE

## 2018-02-03 PROCEDURE — 2060000000 HC ICU INTERMEDIATE R&B

## 2018-02-03 PROCEDURE — 82306 VITAMIN D 25 HYDROXY: CPT

## 2018-02-03 PROCEDURE — 83550 IRON BINDING TEST: CPT

## 2018-02-03 PROCEDURE — 2580000003 HC RX 258: Performed by: FAMILY MEDICINE

## 2018-02-03 PROCEDURE — 83735 ASSAY OF MAGNESIUM: CPT

## 2018-02-03 PROCEDURE — 82607 VITAMIN B-12: CPT

## 2018-02-03 PROCEDURE — 2580000003 HC RX 258: Performed by: INTERNAL MEDICINE

## 2018-02-03 PROCEDURE — 80048 BASIC METABOLIC PNL TOTAL CA: CPT

## 2018-02-03 PROCEDURE — 6360000002 HC RX W HCPCS: Performed by: INTERNAL MEDICINE

## 2018-02-03 PROCEDURE — 82947 ASSAY GLUCOSE BLOOD QUANT: CPT

## 2018-02-03 PROCEDURE — 97530 THERAPEUTIC ACTIVITIES: CPT

## 2018-02-03 PROCEDURE — 97535 SELF CARE MNGMENT TRAINING: CPT

## 2018-02-03 PROCEDURE — 36415 COLL VENOUS BLD VENIPUNCTURE: CPT

## 2018-02-03 PROCEDURE — 85027 COMPLETE CBC AUTOMATED: CPT

## 2018-02-03 PROCEDURE — 94640 AIRWAY INHALATION TREATMENT: CPT

## 2018-02-03 PROCEDURE — 99232 SBSQ HOSP IP/OBS MODERATE 35: CPT | Performed by: INTERNAL MEDICINE

## 2018-02-03 PROCEDURE — C9113 INJ PANTOPRAZOLE SODIUM, VIA: HCPCS | Performed by: FAMILY MEDICINE

## 2018-02-03 PROCEDURE — 94761 N-INVAS EAR/PLS OXIMETRY MLT: CPT

## 2018-02-03 PROCEDURE — 97110 THERAPEUTIC EXERCISES: CPT

## 2018-02-03 PROCEDURE — 83540 ASSAY OF IRON: CPT

## 2018-02-03 PROCEDURE — 84100 ASSAY OF PHOSPHORUS: CPT

## 2018-02-03 RX ORDER — SODIUM CHLORIDE 9 MG/ML
INJECTION, SOLUTION INTRAVENOUS CONTINUOUS
Status: DISCONTINUED | OUTPATIENT
Start: 2018-02-03 | End: 2018-02-05 | Stop reason: HOSPADM

## 2018-02-03 RX ADMIN — LEVALBUTEROL 1.25 MG: 1.25 SOLUTION, CONCENTRATE RESPIRATORY (INHALATION) at 10:16

## 2018-02-03 RX ADMIN — Medication 3.38 G: at 20:28

## 2018-02-03 RX ADMIN — DOXAZOSIN 2 MG: 1 TABLET ORAL at 20:01

## 2018-02-03 RX ADMIN — Medication 3.38 G: at 12:49

## 2018-02-03 RX ADMIN — IRON SUCROSE 200 MG: 20 INJECTION, SOLUTION INTRAVENOUS at 18:02

## 2018-02-03 RX ADMIN — Medication 3.38 G: at 05:49

## 2018-02-03 RX ADMIN — Medication 10 ML: at 08:53

## 2018-02-03 RX ADMIN — LEVALBUTEROL 1.25 MG: 1.25 SOLUTION, CONCENTRATE RESPIRATORY (INHALATION) at 19:13

## 2018-02-03 RX ADMIN — Medication 10 ML: at 20:03

## 2018-02-03 RX ADMIN — METHYLPREDNISOLONE SODIUM SUCCINATE 20 MG: 40 INJECTION, POWDER, FOR SOLUTION INTRAMUSCULAR; INTRAVENOUS at 20:02

## 2018-02-03 RX ADMIN — PANTOPRAZOLE SODIUM 40 MG: 40 INJECTION, POWDER, FOR SOLUTION INTRAVENOUS at 20:01

## 2018-02-03 RX ADMIN — Medication 10 ML: at 20:02

## 2018-02-03 RX ADMIN — ACETYLCYSTEINE 600 MG: 200 SOLUTION ORAL; RESPIRATORY (INHALATION) at 19:13

## 2018-02-03 RX ADMIN — IPRATROPIUM BROMIDE 0.5 MG: 0.5 SOLUTION RESPIRATORY (INHALATION) at 19:13

## 2018-02-03 RX ADMIN — LEVALBUTEROL 1.25 MG: 1.25 SOLUTION, CONCENTRATE RESPIRATORY (INHALATION) at 15:03

## 2018-02-03 RX ADMIN — CARBIDOPA AND LEVODOPA 1 TABLET: 25; 100 TABLET ORAL at 12:42

## 2018-02-03 RX ADMIN — IPRATROPIUM BROMIDE 0.5 MG: 0.5 SOLUTION RESPIRATORY (INHALATION) at 10:16

## 2018-02-03 RX ADMIN — IPRATROPIUM BROMIDE 0.5 MG: 0.5 SOLUTION RESPIRATORY (INHALATION) at 07:26

## 2018-02-03 RX ADMIN — DONEPEZIL HYDROCHLORIDE 10 MG: 10 TABLET, FILM COATED ORAL at 20:01

## 2018-02-03 RX ADMIN — ACETYLCYSTEINE 600 MG: 200 SOLUTION ORAL; RESPIRATORY (INHALATION) at 15:03

## 2018-02-03 RX ADMIN — CARBIDOPA AND LEVODOPA 1 TABLET: 25; 100 TABLET ORAL at 18:02

## 2018-02-03 RX ADMIN — CARBIDOPA AND LEVODOPA 1 TABLET: 25; 100 TABLET ORAL at 08:53

## 2018-02-03 RX ADMIN — METHYLPREDNISOLONE SODIUM SUCCINATE 20 MG: 40 INJECTION, POWDER, FOR SOLUTION INTRAMUSCULAR; INTRAVENOUS at 03:32

## 2018-02-03 RX ADMIN — METHYLPREDNISOLONE SODIUM SUCCINATE 20 MG: 40 INJECTION, POWDER, FOR SOLUTION INTRAMUSCULAR; INTRAVENOUS at 12:42

## 2018-02-03 RX ADMIN — ACETYLCYSTEINE 600 MG: 200 SOLUTION ORAL; RESPIRATORY (INHALATION) at 07:26

## 2018-02-03 RX ADMIN — IPRATROPIUM BROMIDE 0.5 MG: 0.5 SOLUTION RESPIRATORY (INHALATION) at 15:03

## 2018-02-03 RX ADMIN — SODIUM CHLORIDE: 9 INJECTION, SOLUTION INTRAVENOUS at 18:03

## 2018-02-03 RX ADMIN — LEVALBUTEROL 1.25 MG: 1.25 SOLUTION, CONCENTRATE RESPIRATORY (INHALATION) at 07:26

## 2018-02-03 RX ADMIN — CARBIDOPA AND LEVODOPA 1 TABLET: 25; 100 TABLET ORAL at 20:01

## 2018-02-03 NOTE — PROGRESS NOTES
PROGRESS NOTE    Admit Date: 1/22/2018         Subjective: yesterday he tolerated sitting in chair for 4-5 hours was alert communicative ,did start confusion ,,sundowners in pm but apparently slept , tolerated tube feeding well yesterday ,,with low residual up to 40 ml /hr       Diet: Diet NPO Effective Now  Diet Tube Feed Continuous/Cyclic w/ Diet  Pain is:None  Nausea:None  Bowel Movement/Flatus yes    Data:   Scheduled Meds: Reviewed  Continuous Infusions:   dextrose 5% and 0.45% NaCl with KCl 20 mEq 15 mL/hr at 02/01/18 1804    dextrose         Intake/Output Summary (Last 24 hours) at 02/03/18 1013  Last data filed at 02/03/18 0629   Gross per 24 hour   Intake             1510 ml   Output              850 ml   Net              660 ml     Hematology:  Recent Labs      02/01/18   0510  02/02/18   0611   WBC  9.7  9.6   HGB  9.7*  8.8*   HCT  30.5*  26.7*   PLT  304  306     Chemistry:  Recent Labs      02/01/18   0510  02/02/18   0611  02/03/18   0550   NA  140  142  146*   K  4.3  3.4*  3.7   CL  104  109*  111*   CO2  23  22  23   GLUCOSE  133*  156*  145*   BUN  28*  38*  40*   CREATININE  1.18  1.19  1.17   MG  2.2  2.5  2.6   ANIONGAP  13  11  12   LABGLOM  59*  58*  60*   GFRAA  >60  >60  >60   CALCIUM  8.0*  8.0*  8.3*   PHOS  4.5  4.0  2.8     No results for input(s): PROT, LABALBU, LABA1C, J2FNVDK, K4SBFGA, FT4, TSH, AST, ALT, LDH, GGT, ALKPHOS, BILITOT, BILIDIR, AMMONIA, AMYLASE, LIPASE, LACTATE, CHOL, HDL, LDLCHOLESTEROL, CHOLHDLRATIO, TRIG, VLDL, PHENYTOIN, PHENYF in the last 72 hours.     -----------------------------------------------------------------  RAD: none     Objective:   Vitals: BP (!) 142/51   Pulse 69   Temp 97.4 °F (36.3 °C) (Axillary)   Resp 20   Ht 5' 2\" (1.575 m)   Wt 125 lb (56.7 kg)   SpO2 97%   BMI 22.86 kg/m²   General appearance: alert, appears stated age and cooperative  Skin: Skin color, texture, turgor normal.   HEENT: Head: Normocephalic, no lesions, without obvious

## 2018-02-03 NOTE — PLAN OF CARE
Problem: Risk for Impaired Skin Integrity  Goal: Tissue integrity - skin and mucous membranes  Structural intactness and normal physiological function of skin and  mucous membranes. Outcome: Ongoing  Day shift reports that patient is more comfortable in the chair,but patient was transferred back intro bed via lift . Turn and reposition Q2. Air mattress in place.

## 2018-02-03 NOTE — PROGRESS NOTES
PROGRESS NOTE    Admit Date: 1/22/2018         Subjective: doing much better tolerating staying up in chair ,tolerating tube feeding well , no diarrhea   o2 sat greater gwen 94       Diet: Diet NPO Effective Now  Diet Tube Feed Continuous/Cyclic w/ Diet  Pain is:None  Nausea:None  Bowel Movement/Flatus yes    Data:   Scheduled Meds: Reviewed  Continuous Infusions:   dextrose 5% and 0.45% NaCl with KCl 20 mEq 15 mL/hr at 02/01/18 1804    dextrose         Intake/Output Summary (Last 24 hours) at 02/02/18 1954  Last data filed at 02/02/18 1809   Gross per 24 hour   Intake             1743 ml   Output              500 ml   Net             1243 ml     Hematology:  Recent Labs      01/31/18   0433  02/01/18   0510  02/02/18   0611   WBC  12.6*  9.7  9.6   HGB  10.1*  9.7*  8.8*   HCT  30.7*  30.5*  26.7*   PLT  301  304  306     Chemistry:  Recent Labs      01/31/18   0433  01/31/18   1519  02/01/18   0510  02/02/18   0611   NA  138  139  140  142   K  3.5*  3.9  4.3  3.4*   CL  104  106  104  109*   CO2  24  24  23  22   GLUCOSE  107*  104*  133*  156*   BUN  24*  23  28*  38*   CREATININE  0.94  1.07  1.18  1.19   MG  2.0   --   2.2  2.5   ANIONGAP  10  9  13  11   LABGLOM  >60  >60  59*  58*   GFRAA  >60  >60  >60  >60   CALCIUM  7.8*  7.6*  8.0*  8.0*   PHOS  2.7   --   4.5  4.0     No results for input(s): PROT, LABALBU, LABA1C, W3SQOJZ, C0ZCYMQ, FT4, TSH, AST, ALT, LDH, GGT, ALKPHOS, BILITOT, BILIDIR, AMMONIA, AMYLASE, LIPASE, LACTATE, CHOL, HDL, LDLCHOLESTEROL, CHOLHDLRATIO, TRIG, VLDL, PHENYTOIN, PHENYF in the last 72 hours.     -----------------------------------------------------------------  RAD: improvement in airspace disease no chf     Objective:   Vitals: BP (!) 134/54   Pulse 72   Temp 97.7 °F (36.5 °C) (Oral)   Resp 16   Ht 5' 2\" (1.575 m)   Wt 125 lb (56.7 kg)   SpO2 96%   BMI 22.86 kg/m²   General appearance: alert, appears stated age and cooperative  Skin: Skin color, texture, turgor normal.

## 2018-02-03 NOTE — PROGRESS NOTES
 Essential hypertension [I10] 01/28/2018    Closed compression fracture of L1 lumbar vertebra ([facility] [S32.010A] 01/28/2018    Aspiration pneumonia ([facility] [J69.0] 01/22/2018    Parkinson disease ([facility] Rick Stubbs 01/22/2018     Past Medical History:   Diagnosis Date    Arthritis     [facility]bral [facility] ([email or web address]     Chronic kidney disease     COPD (chronic obstructive pulmonary disease) ([email or web address]     Hyperlipidemia     Hypertension     Pneumonia     Raynaud's disease         Plan:        1. Cont feeding  2. F/u HGB  3. PPI  4. IV Iron may help   5. DW Dr RODRIGO [place]     Explained to the patient and d/W Nursing Staff  Will F/U with you  Please call or Page for any issues or change in status  Thanks    Electronically signed by Teena Mcwilliams MD on 2/3/2018 at 3:54 PM

## 2018-02-03 NOTE — PROGRESS NOTES
curvature noted. No significant change from prior study of 1 day earlier. Continued bilateral opacities most compatible with airspace disease. Xr Chest Portable    Result Date: 2/1/2018  EXAMINATION: SINGLE VIEW OF THE CHEST 2/1/2018 6:34 am COMPARISON: 01/30/2018 HISTORY: ORDERING SYSTEM PROVIDED HISTORY: infiltrate TECHNOLOGIST PROVIDED HISTORY: Reason for exam:->infiltrate FINDINGS: Normal cardiomediastinal silhouette. Airspace opacity in the right upper lobe is resolving as is left basilar airspace opacities. No clear pleural effusion or pneumothorax. Bones stable. There is a left upper extremity central line terminating in the axillary region. .     1. Resolving right upper lobe and left basal airspace opacities. 2. Left upper extremity central line terminates in the axillary region. Xr Chest Portable    Result Date: 1/30/2018  EXAMINATION: SINGLE VIEW OF THE CHEST 1/30/2018 4:01 pm COMPARISON: January 28, 2018 HISTORY: ORDERING SYSTEM PROVIDED HISTORY: infiltrate TECHNOLOGIST PROVIDED HISTORY: Reason for exam:->infiltrate Ordering Physician Provided Reason for Exam: dif breathing Relevant Medical/Surgical History: COPD  HTN  cerebral artery occlusion FINDINGS: Stable position of the right internal jugular central venous catheter. Stable cardiomediastinal silhouette. Pulmonary venous congestion is unchanged. Right suprahilar and bibasilar patchy opacities have increased. Trace bilateral pleural effusions are present. No pneumothorax. Increased right suprahilar and bibasilar patchy opacities.      Xr Chest Portable    Result Date: 1/28/2018  EXAMINATION: SINGLE VIEW OF THE CHEST 1/28/2018 6:52 am COMPARISON: January 27, 2018 HISTORY: ORDERING SYSTEM PROVIDED HISTORY: infiltrate TECHNOLOGIST PROVIDED HISTORY: Reason for exam:->infiltrate Ordering Physician Provided Reason for Exam: infiltrate Acuity: Unknown Type of Exam: Unknown FINDINGS: Right internal jugular central venous catheter is unchanged. Prior sternotomy. Borderline cardiomegaly. Increased interstitial left lung opacities. Increased left lung opacities may represent an atypical infection versus pulmonary edema. Xr Chest Portable    Result Date: 1/27/2018  EXAMINATION: SINGLE VIEW OF THE CHEST 1/27/2018 6:31 am COMPARISON: January 26, 2018 HISTORY: ORDERING SYSTEM PROVIDED HISTORY: infiltrate TECHNOLOGIST PROVIDED HISTORY: Reason for exam:->infiltrate FINDINGS: Right internal jugular central venous catheter is unchanged. Persistent right upper lobe and left lower lobe opacities. Prior sternotomy. Mild cardiomegaly. Atherosclerotic calcification of the thoracic aorta. No pulmonary edema. Persistent right upper lobe and left lower lobe opacities suspicious for pneumonia. Xr Chest Portable    Result Date: 1/26/2018  EXAMINATION: SINGLE VIEW OF THE CHEST 1/26/2018 5:53 am COMPARISON: 01/25/2018 HISTORY: ORDERING SYSTEM PROVIDED HISTORY: infiltrate TECHNOLOGIST PROVIDED HISTORY: Reason for exam:->infiltrate FINDINGS: Right jugular central venous catheter is stable in position. Sternal wires from prior heart surgery. Monitor leads overlie the chest.  Stable heart size with calcific plaque of an ectatic thoracic aorta. No significant change in patchy multifocal infiltrates most prominently involving the right upper lobe and left lower lobe. No new focal infiltrates. No large pleural effusions. Previous resection of the lateral clavicles. Similar bilateral infiltrates. Xr Chest Portable    Result Date: 1/25/2018  EXAMINATION: SINGLE VIEW OF THE CHEST 1/25/2018 5:52 am COMPARISON: 01/24/2018 HISTORY: ORDERING SYSTEM PROVIDED HISTORY: infiltrate TECHNOLOGIST PROVIDED HISTORY: Reason for exam:->infiltrate FINDINGS: Stable heart size without evidence of vascular congestion. Monitor leads overlie the chest.  Sternal wires from prior heart surgery.   Right jugular central venous catheter tip remains in the right Underlying COPD changes suspected. Slight right hilar prominence is likely vascular and can be reassessed on follow-up. Slight blunting right costophrenic angle likely due to scarring versus a tiny effusion. Monitor leads overlie the chest.  The bones are osteopenic with degenerative changes of the spine. Narrowing of the left subacromial joint space suggests rotator cuff arthropathy. Multifocal patchy infiltrates in the right upper lobe and left lung base. Follow-up studies are recommended to confirm resolution. Ir Ultrasound Guidance Vascular Access    Result Date: 1/31/2018  Radiology exam is complete. No Radiologist dictation. Please follow up with ordering provider. Fl Modified Barium Swallow W Video    Result Date: 1/30/2018  EXAMINATION: MODIFIED BARIUM SWALLOW WAS PERFORMED IN CONJUNCTION WITH SPEECH PATHOLOGY SERVICES TECHNIQUE: Fluoroscopic evaluation of the swallowing mechanism was performed with multiple consistency of barium product. FLUOROSCOPY DOSE AND TYPE OR TIME AND EXPOSURES: 1.1 minutes, 6.51 mGy COMPARISON: None HISTORY: ORDERING SYSTEM PROVIDED HISTORY: poss. aspiration with PO intake TECHNOLOGIST PROVIDED HISTORY: Reason for exam:->poss. aspiration with PO intake 43-year-old male with possible aspiration with p.o. intake FINDINGS: With the pureed and soft solid substance administration, there was aspiration of the residuals with a subsequent cough. The remainder of the substances were not administered. 1. Aspiration of the residuals of the pureed and soft solid substances with a subsequent cough. 2. Remainder of the substances were not administered. Please see separate speech pathology report for full discussion of findings and recommendations.           Intake/Output Summary (Last 24 hours) at 02/02/18 2008  Last data filed at 02/02/18 1809   Gross per 24 hour   Intake             1593 ml   Output              500 ml   Net             1093 ml        IMPRESSION /

## 2018-02-03 NOTE — PROGRESS NOTES
Nephrology Progress Note      Subjective:   Creatinine stable  Serum Na still elevated    Objective:  CURRENT TEMPERATURE:  Temp: 98.1 °F (36.7 °C)  MAXIMUM TEMPERATURE OVER 24HRS:  Temp (24hrs), Av.8 °F (36.6 °C), Min:97.4 °F (36.3 °C), Max:98.2 °F (36.8 °C)    CURRENT RESPIRATORY RATE:  Resp: (!) 32  CURRENT PULSE:  Pulse: 81  CURRENT BLOOD PRESSURE:  BP: (!) 134/50  24HR BLOOD PRESSURE RANGE:  Systolic (21IFY), JPJ:418 , Min:114 , IPR:752   ; Diastolic (70YBL), ZDI:77, Min:46, Max:56    24HR INTAKE/OUTPUT:      Intake/Output Summary (Last 24 hours) at 18 1648  Last data filed at 18 0900   Gross per 24 hour   Intake             1510 ml   Output              850 ml   Net              660 ml     Weight   Wt Readings from Last 3 Encounters:   18 125 lb (56.7 kg)     Physical Exam:  General: no acute distress, flat affect  Skin: warm and dry, no rash or erythema  Pulmonary: Coarse rhonchi bilaterally, symmetrical.    Cardiovascular: normal rate, normal S1 and S2, no gallops.   Abdomen: soft nontender, bowel sounds present, no organomegaly,  no ascites  Extremities: no cyanosis, clubbing or edema    Current Medications:      methylPREDNISolone sodium (SOLU-MEDROL) injection 20 mg Q8H   carbidopa-levodopa (SINEMET)  MG per tablet 1 tablet 4x Daily   donepezil (ARICEPT) tablet 10 mg Nightly   polyethylene glycol (GLYCOLAX) packet 17 g Daily PRN   acetaminophen (TYLENOL) 160 MG/5ML solution 650 mg Q4H PRN   doxazosin (CARDURA) tablet 2 mg Nightly   dextrose 5 % and 0.45 % NaCl with KCl 20 mEq infusion Continuous   hydrALAZINE (APRESOLINE) injection 10 mg Q6H PRN   levalbuterol (XOPENEX) nebulizer solution 1.25 mg Q4H PRN   levalbuterol (XOPENEX) nebulizer solution 1.25 mg 4x daily   potassium chloride 10 mEq/100 mL IVPB (Peripheral Line) PRN   bisacodyl (DULCOLAX) suppository 10 mg Daily PRN   potassium chloride (KLOR-CON M) extended release tablet 20 mEq Once   pantoprazole (PROTONIX) injection multiple calculi.  Recommend dedicated follow-up   gallbladder ultrasound. 2D ECHO:  Technically difficult study. Left ventricle is normal in size and wall thickness. Global left ventricular systolic function appears normal with estimated EF  of 55% but difficult to assess due to limited visualization. All segments not well visualized. Cannot rule-out abnormal segmental wall  motion. No significant valvular regurgitation or stenosis seen. No pericardial effusion is seen. Assessment:  1. Acute kidney injury: likely secondary to toxic acute tubular necrosis, non oliguric  2. Hypertension, controlled. 3. Respiratory failure currently on nasal  BiPAP. 4. Influenza A.  5. Multifocal pneumonia, aspiration pneumonia. 6. Parkinson's disease. 7. Ischemic heart disease with prior CABG echocardiogram demonstrating preserved EF. 8.           Hypernatremia. 9.           Hypokalemia. 10. Anemia    Plan:  Increase free water per PEG tube to Q4  Start Venofer 200 mg X 5 doses  Renal function stable  BUN increased secondary to steroids  Potassium improved  Norvasc switched to Cardura for BPH advantages  continue to hold ACE-I. Avoid hypotension, nephrotoxic drugs, Fleets enema and IV contrast exposure. Follow up labs ordered. We'll continue to follow. Please do not hesitate to call with questions.   Electronically signed by Antelmo Piña MD on 02/03/18 4:48 PM

## 2018-02-03 NOTE — PROGRESS NOTES
Occupational Therapy  Facility/Department: [facility] PROGRESSIVE CARE  Daily Treatment Note  NAME: Yvan Flores  : 1935  MRN: [id number]    Date of Service: 2/3/2018    Patient Diagnosis(es): The primary encounter diagnosis was Influenza. Diagnoses of Dehydration, Pneumonia of both lungs due to infectious organism, unspecified part of lung, and Acute hypoxemic respiratory failure ([facility] were also pertinent to this visit. has a past medical history of Arthritis; Cerebral artery occlusion with cerebral infarction ([facility] Chronic kidney disease; COPD (chronic obstructive pulmonary disease) ([facility] Hyperlipidemia; Hypertension; Pneumonia; and Raynaud's disease. has a past surgical history that includes Cardiac surgery; Carotid-subclavian Bypass Graft; Carotid endarterectomy (Left); Gastrostomy tube placement (2018); and egd percutaneous placement gastrostomy tube (N/A, 2018). Restrictions  Restrictions/Precautions  Restrictions/Precautions: General Precautions, Fall Risk, Isolation  Required Braces or Orthoses?: No  Position Activity Restriction  Other position/activity restrictions: up with assist, droplet precautions, DNR-CCA   Subjective   General  Chart Reviewed: Yes  Patient assessed for rehabilitation services?: Yes  Response to previous treatment: Patient with no complaints from previous session  Family / Caregiver Present: Yes (Daughter)  Pain Assessment  Patient Currently in Pain: Denies  Vital Signs  Patient Currently in Pain: Denies   Orientation  Orientation  Overall Orientation Status: Impaired  Orientation Level: Oriented to person;Disoriented to place; Disoriented to time  Objective       Exercises  Other: B UE PROM and AAROM completed with B UEs as tolerated  LUE PROM (degrees)  LUE PROM: Exceptions  LUE General PROM: Difficulties following ROM commands; pt does have WFLS AROM for L elbow to hand; PROM L shoulder to 90 degrees with tightness and resistance by pt   RUE AROM (degrees)  RUE AROM : Exceptions  RUE General AROM: minimal RUE active movement noted and pt unable to follow ROM commands excpet for R hand squeezing therapist hand ; difficulties asessing PROM due ot pt resisting        Assessment   Performance deficits / Impairments: Decreased functional mobility ; Decreased ADL status; Decreased ROM; Decreased strength;Decreased sensation;Decreased endurance;Decreased high-level IADLs;Decreased cognition;Decreased vision/visual deficit; Decreased coordination;Decreased balance;Decreased safe awareness  Prognosis: Fair  Decision Making: Medium Complexity  Patient Education: Educ on OT goals, POC and importance of completing B UE exercises  Discharge Recommendations:  (LTAC)  REQUIRES OT FOLLOW UP: Yes  Activity Tolerance  Activity Tolerance: Patient limited by fatigue  Activity Tolerance: Poor activity tolerance  Safety Devices  Safety Devices in place: Yes  Type of devices: Left in bed;Nurse notified;Call light within reach; All fall risk precautions in place  Restraints  Initially in place: No       Discharge Recommendations:   (LTAC)     Plan   Plan  Times per week: 4-5x/week 1x/day   Current Treatment Recommendations: Strengthening, ROM, Balance Training, Functional Mobility Training, Positioning, Safety Education & Training, Pain Management, Cognitive Reorientation, Wheelchair Mobility Training, Endurance Training, Neuromuscular Re-education, Patient/Caregiver Education & Training, Equipment Evaluation, Education, & procurement, Self-Care / ADL, Cognitive/Perceptual Training  G-Code     OutComes Score        AM-PAC Score     Goals  Short term goals  Time Frame for Short term goals: By discharge, pt to demo   Short term goal 1: min assist with self feeding with AE as needed and with min verbal instruction/tactile assist.    Short term goal 2: caregivers to demo good understanding of BUE HEP with hand outs as needed.    Short term goal 3: bed mob techniques with min assist with use

## 2018-02-04 ENCOUNTER — APPOINTMENT (OUTPATIENT)
Dept: GENERAL RADIOLOGY | Age: 83
DRG: 193 | End: 2018-02-04
Payer: MEDICARE

## 2018-02-04 LAB
ANION GAP SERPL CALCULATED.3IONS-SCNC: 12 MMOL/L (ref 9–17)
BUN BLDV-MCNC: 40 MG/DL (ref 8–23)
BUN/CREAT BLD: 37 (ref 9–20)
CALCIUM SERPL-MCNC: 8.4 MG/DL (ref 8.6–10.4)
CHLORIDE BLD-SCNC: 111 MMOL/L (ref 98–107)
CO2: 23 MMOL/L (ref 20–31)
CREAT SERPL-MCNC: 1.07 MG/DL (ref 0.7–1.2)
GFR AFRICAN AMERICAN: >60 ML/MIN
GFR NON-AFRICAN AMERICAN: >60 ML/MIN
GFR SERPL CREATININE-BSD FRML MDRD: ABNORMAL ML/MIN/{1.73_M2}
GFR SERPL CREATININE-BSD FRML MDRD: ABNORMAL ML/MIN/{1.73_M2}
GLUCOSE BLD-MCNC: 151 MG/DL (ref 70–99)
MAGNESIUM: 2.6 MG/DL (ref 1.6–2.6)
PHOSPHORUS: 2.8 MG/DL (ref 2.5–4.5)
POTASSIUM SERPL-SCNC: 4.3 MMOL/L (ref 3.7–5.3)
SODIUM BLD-SCNC: 146 MMOL/L (ref 135–144)

## 2018-02-04 PROCEDURE — 94660 CPAP INITIATION&MGMT: CPT

## 2018-02-04 PROCEDURE — 94640 AIRWAY INHALATION TREATMENT: CPT

## 2018-02-04 PROCEDURE — 2580000003 HC RX 258: Performed by: FAMILY MEDICINE

## 2018-02-04 PROCEDURE — 2700000000 HC OXYGEN THERAPY PER DAY

## 2018-02-04 PROCEDURE — 36415 COLL VENOUS BLD VENIPUNCTURE: CPT

## 2018-02-04 PROCEDURE — 2060000000 HC ICU INTERMEDIATE R&B

## 2018-02-04 PROCEDURE — 6370000000 HC RX 637 (ALT 250 FOR IP): Performed by: FAMILY MEDICINE

## 2018-02-04 PROCEDURE — 99232 SBSQ HOSP IP/OBS MODERATE 35: CPT | Performed by: INTERNAL MEDICINE

## 2018-02-04 PROCEDURE — C9113 INJ PANTOPRAZOLE SODIUM, VIA: HCPCS | Performed by: FAMILY MEDICINE

## 2018-02-04 PROCEDURE — 6360000002 HC RX W HCPCS: Performed by: FAMILY MEDICINE

## 2018-02-04 PROCEDURE — 94760 N-INVAS EAR/PLS OXIMETRY 1: CPT

## 2018-02-04 PROCEDURE — 6370000000 HC RX 637 (ALT 250 FOR IP): Performed by: INTERNAL MEDICINE

## 2018-02-04 PROCEDURE — 80048 BASIC METABOLIC PNL TOTAL CA: CPT

## 2018-02-04 PROCEDURE — 2500000003 HC RX 250 WO HCPCS: Performed by: INTERNAL MEDICINE

## 2018-02-04 PROCEDURE — 71045 X-RAY EXAM CHEST 1 VIEW: CPT

## 2018-02-04 PROCEDURE — 83735 ASSAY OF MAGNESIUM: CPT

## 2018-02-04 PROCEDURE — 84100 ASSAY OF PHOSPHORUS: CPT

## 2018-02-04 RX ORDER — AMOXICILLIN AND CLAVULANATE POTASSIUM 500; 125 MG/1; MG/1
1 TABLET, FILM COATED ORAL EVERY 12 HOURS SCHEDULED
Status: DISCONTINUED | OUTPATIENT
Start: 2018-02-04 | End: 2018-02-05 | Stop reason: HOSPADM

## 2018-02-04 RX ORDER — ACETYLCYSTEINE 200 MG/ML
600 SOLUTION ORAL; RESPIRATORY (INHALATION) 3 TIMES DAILY
Qty: 120 ML | Refills: 0 | Status: CANCELLED
Start: 2018-02-04

## 2018-02-04 RX ORDER — ERYTHROMYCIN 5 MG/G
OINTMENT OPHTHALMIC NIGHTLY
Status: DISCONTINUED | OUTPATIENT
Start: 2018-02-04 | End: 2018-02-05 | Stop reason: HOSPADM

## 2018-02-04 RX ADMIN — IPRATROPIUM BROMIDE 0.5 MG: 0.5 SOLUTION RESPIRATORY (INHALATION) at 15:24

## 2018-02-04 RX ADMIN — ACETYLCYSTEINE 600 MG: 200 SOLUTION ORAL; RESPIRATORY (INHALATION) at 15:24

## 2018-02-04 RX ADMIN — ERYTHROMYCIN: 5 OINTMENT OPHTHALMIC at 22:07

## 2018-02-04 RX ADMIN — PANTOPRAZOLE SODIUM 40 MG: 40 INJECTION, POWDER, FOR SOLUTION INTRAVENOUS at 22:07

## 2018-02-04 RX ADMIN — CARBIDOPA AND LEVODOPA 1 TABLET: 25; 100 TABLET ORAL at 17:46

## 2018-02-04 RX ADMIN — IPRATROPIUM BROMIDE 0.5 MG: 0.5 SOLUTION RESPIRATORY (INHALATION) at 10:54

## 2018-02-04 RX ADMIN — LEVALBUTEROL 1.25 MG: 1.25 SOLUTION, CONCENTRATE RESPIRATORY (INHALATION) at 19:21

## 2018-02-04 RX ADMIN — LEVALBUTEROL 1.25 MG: 1.25 SOLUTION, CONCENTRATE RESPIRATORY (INHALATION) at 08:18

## 2018-02-04 RX ADMIN — CARBIDOPA AND LEVODOPA 1 TABLET: 25; 100 TABLET ORAL at 22:06

## 2018-02-04 RX ADMIN — LEVALBUTEROL 1.25 MG: 1.25 SOLUTION, CONCENTRATE RESPIRATORY (INHALATION) at 10:54

## 2018-02-04 RX ADMIN — METHYLPREDNISOLONE SODIUM SUCCINATE 20 MG: 40 INJECTION, POWDER, FOR SOLUTION INTRAMUSCULAR; INTRAVENOUS at 22:06

## 2018-02-04 RX ADMIN — Medication 3.38 G: at 05:37

## 2018-02-04 RX ADMIN — Medication 10 ML: at 09:16

## 2018-02-04 RX ADMIN — DOXAZOSIN 2 MG: 1 TABLET ORAL at 22:06

## 2018-02-04 RX ADMIN — IPRATROPIUM BROMIDE 0.5 MG: 0.5 SOLUTION RESPIRATORY (INHALATION) at 19:21

## 2018-02-04 RX ADMIN — METHYLPREDNISOLONE SODIUM SUCCINATE 20 MG: 40 INJECTION, POWDER, FOR SOLUTION INTRAMUSCULAR; INTRAVENOUS at 03:35

## 2018-02-04 RX ADMIN — DONEPEZIL HYDROCHLORIDE 10 MG: 10 TABLET, FILM COATED ORAL at 22:06

## 2018-02-04 RX ADMIN — Medication 10 ML: at 22:07

## 2018-02-04 RX ADMIN — CARBIDOPA AND LEVODOPA 1 TABLET: 25; 100 TABLET ORAL at 13:47

## 2018-02-04 RX ADMIN — LEVALBUTEROL 1.25 MG: 1.25 SOLUTION, CONCENTRATE RESPIRATORY (INHALATION) at 15:24

## 2018-02-04 RX ADMIN — METHYLPREDNISOLONE SODIUM SUCCINATE 20 MG: 40 INJECTION, POWDER, FOR SOLUTION INTRAMUSCULAR; INTRAVENOUS at 13:47

## 2018-02-04 RX ADMIN — IPRATROPIUM BROMIDE 0.5 MG: 0.5 SOLUTION RESPIRATORY (INHALATION) at 08:16

## 2018-02-04 RX ADMIN — Medication 10 ML: at 22:08

## 2018-02-04 RX ADMIN — ACETYLCYSTEINE 600 MG: 200 SOLUTION ORAL; RESPIRATORY (INHALATION) at 19:21

## 2018-02-04 RX ADMIN — Medication 3.38 G: at 13:45

## 2018-02-04 RX ADMIN — AMOXICILLIN AND CLAVULANATE POTASSIUM 1 TABLET: 500; 125 TABLET, FILM COATED ORAL at 22:06

## 2018-02-04 RX ADMIN — CARBIDOPA AND LEVODOPA 1 TABLET: 25; 100 TABLET ORAL at 09:16

## 2018-02-04 NOTE — PROGRESS NOTES
Pulmonary Critical Care Progress Note       Patient seen for the follow up of Respiratory failure, COPD exacerbation, pneumonia, influenza A    Subjective:  He is tolerating tube feeds  . He is more alert and follows commands. His daughter is at the bedside. He is nonverbal.   He does not answer quetstions. He is is on 4 liers O2. Examination:  Vitals: BP (!) 143/49   Pulse 89   Temp 98.4 °F (36.9 °C) (Oral)   Resp 28   Ht 5' 2\" (1.575 m)   Wt 125 lb (56.7 kg)   SpO2 98%   BMI 22.86 kg/m²     General appearance: No signs of distress, awake  Neck: No JVD  Lungs: + Rhonchi, no wheeze  Heart: regular rate and rhythm, S1, S2 normal, no gallop  Abdomen: Soft, non tender, + BS PEG  Extremities: no cyanosis or clubbing.  No significant edema    LABs:  CBC:   Recent Labs      02/02/18   0611  02/03/18   1614   WBC  9.6  8.9   HGB  8.8*  10.2*   HCT  26.7*  31.8*   PLT  306  323     BMP:   Recent Labs      02/03/18   0550  02/04/18   0708   NA  146*  146*   K  3.7  4.3   CO2  23  23   BUN  40*  40*   CREATININE  1.17  1.07   LABGLOM  60*  >60   GLUCOSE  145*  151*     Radiology:  2/4/18    Bilateral pulmonary infiltrates, unchanged, likely related to inflammatory   process/infection.  Left basilar atelectasis, unchanged.             Impression:  · Acute Hypoxic respiratory failure  · Acute exacerbation of COPD  · Aspiration pneumonia  · Influenza A  · Pulmonary edema  · Dysphasia  · Parkinson's dementia    Recommendations:  · Titrate O2 via nasal cannula  · BIPAP support RN  · Xopenex and Ipratropium 4 times/ day  · Solumedrol 20 IV Q 12 hrs  · PEG tube feeds  · Switch Zosyn IV to Augmentin 500 by PEG  twice a day  · IV fluids per nephrology  · Discussed with Dr Jovany Gonzalez  · PT  · DVT prophylaxis with low molecular weight heparin  · discharge planning to Alexandr Villasenor MD on 2/4/2018 at 2:55 PM  Pulmonary Critical Care and Sleep Medicine,  [facility] AT [place]: [phone]

## 2018-02-04 NOTE — PROGRESS NOTES
pitting; Skin:  +bruising, BUE, +abrasion, (R) forehead, +redness, coccyx (preventative dressing)  · Wound Type: Skin Tears  · Current Nutrition Therapies:  · Oral Diet Orders: NPO   · Oral Diet intake: NPO  · Tube Feeding (TF) Orders:   · Feeding Route: Gastrostomy  · Formula: Standard w/Fiber  · Rate (ml/hr):@ 50 ml/hr    · Volume (ml/day): 1,200 ml  · Duration: Continuous 24hrs  · TF Residuals: Less than or equal to 250ml  · Water Flushes:  (200 ml, x 6/day)  · Current TF & Flush Orders Provides: 1,440 kcal, 67 g protein  · Goal TF & Flush Orders Provides: @ 55 ml/hr (goal):  1,584 kcal, 73 g protein, 24 g dietary fiber, 1,065 ml free water + 1,200 ml water flushes=2,265 ml total fluid/day. · Additional Calories: IV D5%:  ~60 kcal  · Anthropometric Measures:  · Ht: 5' 2\" (157.5 cm)   · Current Body Wt: 125 lb (56.7 kg)  · Admission Body Wt: 125 lb (56.7 kg)  · Ideal Body Wt: 118 lb (53.5 kg), % Ideal Body 106%  · BMI Classification: BMI 18.5 - 24.9 Normal Weight  · Comparative Standards (Estimated Nutrition Needs):  · Estimated Daily Total Kcal: 1,450-1,600 kcal  · Estimated Daily Protein (g): 55-65 g  · Estimated Daily Fluid (ml/day): 2,200-2,300 ml (per Nephrologist)    Estimated Intake vs Estimated Needs: Intake Improving    Nutrition Risk Level: High    Nutrition Interventions:   Continue current diet, Continue current Tube Feeding  Continued Inpatient Monitoring, Coordination of Care    Nutrition Evaluation:   · Evaluation: Progressing toward goals   · Goals: EN intake to meet >90% of estimated kcal/protein needs, with good GI folerance, glycemic control, management of renal indices   · Monitoring: NPO Status, TF Intake, TF Tolerance, Gastric Residuals, Skin Integrity, Wound Healing, Ascites/Edema, Mental Status/Confusion, Weight, Pertinent Labs, Nausea or Vomiting    See Adult Nutrition Doc Flowsheet for more detail.      Electronically signed by Dakota Pfeiffer RDN, NILSA on 2/4/18 at 4:23 PM    Contact Number: [phone]

## 2018-02-04 NOTE — PLAN OF CARE
Problem: Risk for Impaired Skin Integrity  Goal: Tissue integrity - skin and mucous membranes  Structural intactness and normal physiological function of skin and  mucous membranes. Outcome: Ongoing  Skin assessed each shift and prn. Mucus membranes dry and intact. Patient turned and repositioned as needed. Pillow support utilized. Overlay mattress in use. Heels elevated as needed. Dressings changed as needed and per orders. Will continue to monitor skin for breakdown.

## 2018-02-05 VITALS
SYSTOLIC BLOOD PRESSURE: 167 MMHG | HEART RATE: 62 BPM | WEIGHT: 125 LBS | DIASTOLIC BLOOD PRESSURE: 55 MMHG | RESPIRATION RATE: 40 BRPM | TEMPERATURE: 98 F | HEIGHT: 62 IN | BODY MASS INDEX: 23 KG/M2 | OXYGEN SATURATION: 92 %

## 2018-02-05 LAB
ANION GAP SERPL CALCULATED.3IONS-SCNC: 9 MMOL/L (ref 9–17)
BUN BLDV-MCNC: 36 MG/DL (ref 8–23)
BUN/CREAT BLD: 39 (ref 9–20)
CALCIUM SERPL-MCNC: 8.2 MG/DL (ref 8.6–10.4)
CHLORIDE BLD-SCNC: 112 MMOL/L (ref 98–107)
CO2: 23 MMOL/L (ref 20–31)
CREAT SERPL-MCNC: 0.93 MG/DL (ref 0.7–1.2)
GFR AFRICAN AMERICAN: >60 ML/MIN
GFR NON-AFRICAN AMERICAN: >60 ML/MIN
GFR SERPL CREATININE-BSD FRML MDRD: ABNORMAL ML/MIN/{1.73_M2}
GFR SERPL CREATININE-BSD FRML MDRD: ABNORMAL ML/MIN/{1.73_M2}
GLUCOSE BLD-MCNC: 170 MG/DL (ref 70–99)
HCT VFR BLD CALC: 28.5 % (ref 41–53)
HEMOGLOBIN: 9.1 G/DL (ref 13.5–17.5)
MAGNESIUM: 2.3 MG/DL (ref 1.6–2.6)
MCH RBC QN AUTO: 29.7 PG (ref 26–34)
MCHC RBC AUTO-ENTMCNC: 31.7 G/DL (ref 31–37)
MCV RBC AUTO: 93.5 FL (ref 80–100)
NRBC AUTOMATED: ABNORMAL PER 100 WBC
PDW BLD-RTO: 14.6 % (ref 11.5–14.5)
PHOSPHORUS: 2.6 MG/DL (ref 2.5–4.5)
PLATELET # BLD: 306 K/UL (ref 130–400)
PMV BLD AUTO: 8.8 FL (ref 6–12)
POTASSIUM SERPL-SCNC: 4.4 MMOL/L (ref 3.7–5.3)
RBC # BLD: 3.05 M/UL (ref 4.5–5.9)
SODIUM BLD-SCNC: 144 MMOL/L (ref 135–144)
WBC # BLD: 8.7 K/UL (ref 3.5–11)

## 2018-02-05 PROCEDURE — 6370000000 HC RX 637 (ALT 250 FOR IP): Performed by: FAMILY MEDICINE

## 2018-02-05 PROCEDURE — 2580000003 HC RX 258: Performed by: FAMILY MEDICINE

## 2018-02-05 PROCEDURE — 94640 AIRWAY INHALATION TREATMENT: CPT

## 2018-02-05 PROCEDURE — 80048 BASIC METABOLIC PNL TOTAL CA: CPT

## 2018-02-05 PROCEDURE — 2700000000 HC OXYGEN THERAPY PER DAY

## 2018-02-05 PROCEDURE — 85027 COMPLETE CBC AUTOMATED: CPT

## 2018-02-05 PROCEDURE — 6360000002 HC RX W HCPCS: Performed by: FAMILY MEDICINE

## 2018-02-05 PROCEDURE — 83735 ASSAY OF MAGNESIUM: CPT

## 2018-02-05 PROCEDURE — 84100 ASSAY OF PHOSPHORUS: CPT

## 2018-02-05 PROCEDURE — 36415 COLL VENOUS BLD VENIPUNCTURE: CPT

## 2018-02-05 PROCEDURE — 94761 N-INVAS EAR/PLS OXIMETRY MLT: CPT

## 2018-02-05 PROCEDURE — 6370000000 HC RX 637 (ALT 250 FOR IP): Performed by: INTERNAL MEDICINE

## 2018-02-05 PROCEDURE — 2580000003 HC RX 258: Performed by: INTERNAL MEDICINE

## 2018-02-05 RX ORDER — LEVALBUTEROL 1.25 MG/.5ML
1.25 SOLUTION, CONCENTRATE RESPIRATORY (INHALATION) EVERY 4 HOURS PRN
Qty: 120 EACH | Refills: 0 | Status: SHIPPED | OUTPATIENT
Start: 2018-02-05

## 2018-02-05 RX ORDER — METHYLPREDNISOLONE SODIUM SUCCINATE 40 MG/ML
20 INJECTION, POWDER, LYOPHILIZED, FOR SOLUTION INTRAMUSCULAR; INTRAVENOUS EVERY 8 HOURS
Qty: 1 EACH | Refills: 0 | Status: SHIPPED | OUTPATIENT
Start: 2018-02-05

## 2018-02-05 RX ORDER — BISACODYL 10 MG
10 SUPPOSITORY, RECTAL RECTAL DAILY PRN
Qty: 20 SUPPOSITORY | Refills: 0 | Status: SHIPPED | OUTPATIENT
Start: 2018-02-05 | End: 2018-03-07

## 2018-02-05 RX ORDER — ACETYLCYSTEINE 200 MG/ML
600 SOLUTION ORAL; RESPIRATORY (INHALATION) 3 TIMES DAILY
Qty: 120 ML | Refills: 0 | Status: SHIPPED | OUTPATIENT
Start: 2018-02-05

## 2018-02-05 RX ORDER — ACETAMINOPHEN 160 MG/5ML
650 SOLUTION ORAL EVERY 4 HOURS PRN
Qty: 473 ML | Refills: 3
Start: 2018-02-05

## 2018-02-05 RX ORDER — POLYETHYLENE GLYCOL 3350 17 G/17G
POWDER, FOR SOLUTION ORAL
Qty: 7 EACH | Refills: 0 | Status: SHIPPED | OUTPATIENT
Start: 2018-02-05

## 2018-02-05 RX ORDER — LEVALBUTEROL 1.25 MG/.5ML
1.25 SOLUTION, CONCENTRATE RESPIRATORY (INHALATION) 4 TIMES DAILY
Qty: 120 EACH | Refills: 3
Start: 2018-02-05

## 2018-02-05 RX ORDER — SODIUM CHLORIDE 0.9 % (FLUSH) 0.9 %
10 SYRINGE (ML) INJECTION EVERY 12 HOURS SCHEDULED
Qty: 10 ML | Refills: 0
Start: 2018-02-05

## 2018-02-05 RX ORDER — POTASSIUM CHLORIDE 7.45 MG/ML
10 INJECTION INTRAVENOUS PRN
Qty: 100 ML | Refills: 0
Start: 2018-02-05

## 2018-02-05 RX ORDER — ERYTHROMYCIN 5 MG/G
OINTMENT OPHTHALMIC
Qty: 1 G | Refills: 0 | Status: SHIPPED | OUTPATIENT
Start: 2018-02-05 | End: 2018-02-15

## 2018-02-05 RX ORDER — AMOXICILLIN AND CLAVULANATE POTASSIUM 500; 125 MG/1; MG/1
1 TABLET, FILM COATED ORAL EVERY 12 HOURS SCHEDULED
Qty: 20 TABLET | Refills: 0 | Status: SHIPPED | OUTPATIENT
Start: 2018-02-05 | End: 2018-02-15

## 2018-02-05 RX ORDER — PANTOPRAZOLE SODIUM 40 MG/1
40 TABLET, DELAYED RELEASE ORAL DAILY
Qty: 30 TABLET | Refills: 3 | Status: SHIPPED | OUTPATIENT
Start: 2018-02-05

## 2018-02-05 RX ORDER — DOXAZOSIN 2 MG/1
2 TABLET ORAL NIGHTLY
Qty: 30 TABLET | Refills: 3 | Status: SHIPPED | OUTPATIENT
Start: 2018-02-05

## 2018-02-05 RX ADMIN — IPRATROPIUM BROMIDE 0.5 MG: 0.5 SOLUTION RESPIRATORY (INHALATION) at 12:17

## 2018-02-05 RX ADMIN — CARBIDOPA AND LEVODOPA 1 TABLET: 25; 100 TABLET ORAL at 12:20

## 2018-02-05 RX ADMIN — LEVALBUTEROL 1.25 MG: 1.25 SOLUTION, CONCENTRATE RESPIRATORY (INHALATION) at 12:20

## 2018-02-05 RX ADMIN — ACETYLCYSTEINE 600 MG: 200 SOLUTION ORAL; RESPIRATORY (INHALATION) at 07:43

## 2018-02-05 RX ADMIN — IPRATROPIUM BROMIDE 0.5 MG: 0.5 SOLUTION RESPIRATORY (INHALATION) at 07:43

## 2018-02-05 RX ADMIN — METHYLPREDNISOLONE SODIUM SUCCINATE 20 MG: 40 INJECTION, POWDER, FOR SOLUTION INTRAMUSCULAR; INTRAVENOUS at 10:57

## 2018-02-05 RX ADMIN — Medication 10 ML: at 08:22

## 2018-02-05 RX ADMIN — SODIUM CHLORIDE: 9 INJECTION, SOLUTION INTRAVENOUS at 10:57

## 2018-02-05 RX ADMIN — METHYLPREDNISOLONE SODIUM SUCCINATE 20 MG: 40 INJECTION, POWDER, FOR SOLUTION INTRAMUSCULAR; INTRAVENOUS at 03:28

## 2018-02-05 RX ADMIN — CARBIDOPA AND LEVODOPA 1 TABLET: 25; 100 TABLET ORAL at 08:22

## 2018-02-05 RX ADMIN — LEVALBUTEROL 1.25 MG: 1.25 SOLUTION, CONCENTRATE RESPIRATORY (INHALATION) at 07:43

## 2018-02-05 RX ADMIN — AMOXICILLIN AND CLAVULANATE POTASSIUM 1 TABLET: 500; 125 TABLET, FILM COATED ORAL at 08:22

## 2018-02-05 NOTE — PROGRESS NOTES
suggesting   contracted gallbladder with multiple calculi.  Recommend dedicated follow-up   gallbladder ultrasound. 2D ECHO:  Technically difficult study. Left ventricle is normal in size and wall thickness. Global left ventricular systolic function appears normal with estimated EF  of 55% but difficult to assess due to limited visualization. All segments not well visualized. Cannot rule-out abnormal segmental wall  motion. No significant valvular regurgitation or stenosis seen. No pericardial effusion is seen. Assessment:  1. Acute kidney injury: likely secondary to toxic acute tubular necrosis, non oliguric  2. Hypertension  3. Respiratory failure currently on nasal  BiPAP. 4. Influenza A.  5. Multifocal pneumonia, aspiration pneumonia. 6. Parkinson's disease. 7. Ischemic heart disease with prior CABG echocardiogram demonstrating preserved EF. 8.           Hypernatremia. 9.           Hypokalemia. 10. Anemia    Plan: Will increase free water flushes per PEG tube were increased to 300 ml Q4 Hr, monitor sodium level  Continue Venofer replacement course, 200 mg X 5 doses  Renal function stable  Norvasc switched to Cardura for BPH advantages  Continue to hold ACE-I. Avoid hypotension, nephrotoxic drugs, Fleets enema and IV contrast exposure. We'll continue to follow. Awaiting transfer to [facility], [facility]    Please do not hesitate to call with questions. Electronically signed by Jim Park MD on 2/5/2018 at 9:28 1200 [facility].   Ph: [phone]

## 2018-02-05 NOTE — PLAN OF CARE
Problem: Risk for Impaired Skin Integrity  Goal: Tissue integrity - skin and mucous membranes  Structural intactness and normal physiological function of skin and  mucous membranes. Outcome: Ongoing  Skin assessment completed each shift. Mucus membranes intact. Patient turned and repositioned as needed. Heels elevated as needed. Dressings changed as needed and per orders. Continue to monitor skin for breakdown. Intervention: SKIN ASSESSMENT  See assessment. Intervention: PRESSURE ULCER PREVENTION  Mattress overlay in place. Patient repositioned every 2 hours with pillow support.

## 2018-02-05 NOTE — PROGRESS NOTES
PROGRESS NOTE    Admit Date: 1/22/2018         Subjective: doing better this am more alert and communicative ,had significant expectorant of tenacious material from oral cavity,,tolerating tube feeding well some stooling       Diet: Diet NPO Effective Now  Diet Tube Feed Continuous/Cyclic w/ Diet  Pain is:Mild  Nausea:None  Bowel Movement/Flatus yes    Data:   Scheduled Meds: Reviewed  Continuous Infusions:   sodium chloride 15 mL/hr at 02/03/18 1803    dextrose         Intake/Output Summary (Last 24 hours) at 02/05/18 0900  Last data filed at 02/05/18 0818   Gross per 24 hour   Intake             2663 ml   Output             1125 ml   Net             1538 ml     Hematology:  Recent Labs      02/03/18   1614  02/05/18   0518   WBC  8.9  8.7   HGB  10.2*  9.1*   HCT  31.8*  28.5*   PLT  323  306     Chemistry:  Recent Labs      02/03/18   0550  02/04/18   0708  02/05/18   0518   NA  146*  146*  144   K  3.7  4.3  4.4   CL  111*  111*  112*   CO2  23  23  23   GLUCOSE  145*  151*  170*   BUN  40*  40*  36*   CREATININE  1.17  1.07  0.93   MG  2.6  2.6  2.3   ANIONGAP  12  12  9   LABGLOM  60*  >60  >60   GFRAA  >60  >60  >60   CALCIUM  8.3*  8.4*  8.2*   PHOS  2.8  2.8  2.6     No results for input(s): PROT, LABALBU, LABA1C, R2OMJPK, T7AGNVN, FT4, TSH, AST, ALT, LDH, GGT, ALKPHOS, BILITOT, BILIDIR, AMMONIA, AMYLASE, LIPASE, LACTATE, CHOL, HDL, LDLCHOLESTEROL, CHOLHDLRATIO, TRIG, VLDL, PHENYTOIN, PHENYF in the last 72 hours. -----------------------------------------------------------------  RAD:     Objective:   Vitals: BP (!) 114/94   Pulse 121   Temp 98.1 °F (36.7 °C) (Axillary)   Resp (!) 40   Ht 5' 2\" (1.575 m)   Wt 125 lb (56.7 kg)   SpO2 100%   BMI 22.86 kg/m²   General appearance: alert, appears stated age and cooperative  Skin: Skin color, texture, turgor normal.   HEENT: Head: Normocephalic, no lesions, without obvious abnormality.   Neck: no adenopathy, no carotid bruit, no JVD, supple, symmetrical, trachea midline and thyroid not enlarged, symmetric, no tenderness/mass/nodules  Lungs: diminished breath sounds bibasilar and bilaterally and rhonchi anterior - bilateral, posterior - bilateral and RLL  Heart: regular rate and rhythm, S1, S2 normal, no murmur, click, rub or gallop  Abdomen: soft, non-tender; bowel sounds normal; no masses,  no organomegaly  Extremities: extremities normal, atraumatic, no cyanosis or edema  Lymphatic: No significant lymph node enlargement papable  Neurologic: Mental status: Alert, oriented, thought content appropriate      Assessment & Plan:    Patient Active Problem List:     Aspiration pneumonia ([address]     Influenza A     Acute on chronic renal insufficiency     Parkinson disease ([address]     Acute respiratory distress     Hypoxia     Acute hypernatremia     Hypokalemia     Cerebrovascular disease     Essential hypertension     Closed compression fracture of L1 lumbar vertebra (HCC)     Influenza     PEG (percutaneous endoscopic gastrostomy) adjustment/replacement/removal ([address]      See orders   Disposition: hopefully to discharge to [facility] this afternoon  Pt to see ,, tolerating oral antibiotic still on iv solumedrol    Milka Granados

## 2018-02-06 ENCOUNTER — HOSPITAL ENCOUNTER (OUTPATIENT)
Age: 83
Setting detail: SPECIMEN
Discharge: HOME | End: 2018-02-06
Payer: MEDICARE

## 2018-02-06 LAB
ESTIMATED AVERAGE GLUCOSE: 123 MG/DL
HBA1C MFR BLD: 5.9 % (ref 4–6)
TSH SERPL DL<=0.05 MIU/L-ACNC: 3.5 MIU/L (ref 0.3–5)
VITAMIN D 25-HYDROXY: 19.6 NG/ML (ref 30–100)

## 2018-02-06 PROCEDURE — 82306 VITAMIN D 25 HYDROXY: CPT

## 2018-02-06 PROCEDURE — 87493 C DIFF AMPLIFIED PROBE: CPT

## 2018-02-06 PROCEDURE — 83036 HEMOGLOBIN GLYCOSYLATED A1C: CPT

## 2018-02-06 PROCEDURE — 84443 ASSAY THYROID STIM HORMONE: CPT

## 2018-02-07 ENCOUNTER — HOSPITAL ENCOUNTER (OUTPATIENT)
Age: 83
Setting detail: SPECIMEN
Discharge: HOME | End: 2018-02-07
Payer: MEDICARE

## 2018-02-07 LAB
C DIFFICILE TOXINS, PCR: NORMAL
SPECIMEN DESCRIPTION: NORMAL

## 2018-02-07 PROCEDURE — 87070 CULTURE OTHR SPECIMN AEROBIC: CPT

## 2018-02-07 PROCEDURE — 87205 SMEAR GRAM STAIN: CPT

## 2018-02-09 ENCOUNTER — HOSPITAL ENCOUNTER (OUTPATIENT)
Age: 83
Setting detail: SPECIMEN
Discharge: HOME | End: 2018-02-09
Payer: MEDICARE

## 2018-02-09 LAB
BNP INTERPRETATION: ABNORMAL
CULTURE: ABNORMAL
CULTURE: ABNORMAL
DIRECT EXAM: ABNORMAL
Lab: ABNORMAL
PRO-BNP: 548 PG/ML
SPECIMEN DESCRIPTION: ABNORMAL
SPECIMEN DESCRIPTION: ABNORMAL
STATUS: ABNORMAL

## 2018-02-09 PROCEDURE — 83880 ASSAY OF NATRIURETIC PEPTIDE: CPT

## 2018-02-12 ENCOUNTER — HOSPITAL ENCOUNTER (OUTPATIENT)
Age: 83
Setting detail: SPECIMEN
Discharge: HOME | End: 2018-02-12
Payer: MEDICARE

## 2018-02-12 LAB
BNP INTERPRETATION: ABNORMAL
PRO-BNP: 1035 PG/ML

## 2018-02-12 PROCEDURE — 83880 ASSAY OF NATRIURETIC PEPTIDE: CPT

## 2018-02-14 ENCOUNTER — HOSPITAL ENCOUNTER (OUTPATIENT)
Age: 83
Setting detail: SPECIMEN
Discharge: HOME | End: 2018-02-14
Payer: MEDICARE

## 2018-02-14 LAB
BNP INTERPRETATION: ABNORMAL
C DIFFICILE TOXINS, PCR: ABNORMAL
PRO-BNP: 517 PG/ML
SPECIMEN DESCRIPTION: ABNORMAL

## 2018-02-14 PROCEDURE — 83880 ASSAY OF NATRIURETIC PEPTIDE: CPT

## 2018-02-14 PROCEDURE — 87493 C DIFF AMPLIFIED PROBE: CPT

## 2018-02-25 ENCOUNTER — HOSPITAL ENCOUNTER (OUTPATIENT)
Age: 83
Setting detail: SPECIMEN
Discharge: HOME | End: 2018-02-25
Payer: MEDICARE

## 2018-02-25 LAB
ANION GAP SERPL CALCULATED.3IONS-SCNC: 13 MMOL/L (ref 9–17)
BUN BLDV-MCNC: 36 MG/DL (ref 8–23)
BUN/CREAT BLD: 60 (ref 9–20)
CALCIUM SERPL-MCNC: 7.7 MG/DL (ref 8.6–10.4)
CHLORIDE BLD-SCNC: 105 MMOL/L (ref 98–107)
CO2: 24 MMOL/L (ref 20–31)
CREAT SERPL-MCNC: 0.6 MG/DL (ref 0.7–1.2)
GFR AFRICAN AMERICAN: >60 ML/MIN
GFR NON-AFRICAN AMERICAN: >60 ML/MIN
GFR SERPL CREATININE-BSD FRML MDRD: ABNORMAL ML/MIN/{1.73_M2}
GFR SERPL CREATININE-BSD FRML MDRD: ABNORMAL ML/MIN/{1.73_M2}
GLUCOSE BLD-MCNC: 71 MG/DL (ref 70–99)
HCT VFR BLD CALC: 31.8 % (ref 41–53)
HEMOGLOBIN: 10 G/DL (ref 13.5–17.5)
MCH RBC QN AUTO: 29.8 PG (ref 26–34)
MCHC RBC AUTO-ENTMCNC: 31.3 G/DL (ref 31–37)
MCV RBC AUTO: 95 FL (ref 80–100)
NRBC AUTOMATED: ABNORMAL PER 100 WBC
PDW BLD-RTO: 19.6 % (ref 11.5–14.5)
PLATELET # BLD: 180 K/UL (ref 130–400)
PMV BLD AUTO: 10.4 FL (ref 6–12)
POTASSIUM SERPL-SCNC: 4 MMOL/L (ref 3.7–5.3)
RBC # BLD: 3.35 M/UL (ref 4.5–5.9)
SODIUM BLD-SCNC: 142 MMOL/L (ref 135–144)
WBC # BLD: 6.2 K/UL (ref 3.5–11)

## 2018-02-25 PROCEDURE — 80048 BASIC METABOLIC PNL TOTAL CA: CPT

## 2018-02-25 PROCEDURE — 85027 COMPLETE CBC AUTOMATED: CPT

## 2018-02-26 ENCOUNTER — HOSPITAL ENCOUNTER (OUTPATIENT)
Age: 83
Setting detail: SPECIMEN
Discharge: HOME | End: 2018-02-26
Payer: MEDICARE

## 2018-02-26 LAB
C DIFFICILE TOXINS, PCR: NORMAL
CALCIUM IONIZED: 1.19 MMOL/L (ref 1.13–1.33)
SPECIMEN DESCRIPTION: NORMAL

## 2018-02-26 PROCEDURE — 82330 ASSAY OF CALCIUM: CPT

## 2018-02-26 PROCEDURE — 87493 C DIFF AMPLIFIED PROBE: CPT

## 2018-02-28 PROCEDURE — 87070 CULTURE OTHR SPECIMN AEROBIC: CPT

## 2018-02-28 PROCEDURE — 87205 SMEAR GRAM STAIN: CPT

## 2018-03-01 ENCOUNTER — HOSPITAL ENCOUNTER (OUTPATIENT)
Age: 83
Setting detail: SPECIMEN
Discharge: HOME | End: 2018-03-01
Payer: MEDICARE

## 2018-03-03 ENCOUNTER — HOSPITAL ENCOUNTER (OUTPATIENT)
Age: 83
Setting detail: SPECIMEN
Discharge: HOME | End: 2018-03-03
Payer: MEDICARE

## 2018-03-03 LAB
CULTURE: ABNORMAL
CULTURE: ABNORMAL
DIRECT EXAM: ABNORMAL
Lab: ABNORMAL
Lab: ABNORMAL
SPECIMEN DESCRIPTION: ABNORMAL
SPECIMEN DESCRIPTION: ABNORMAL
STATUS: ABNORMAL

## 2018-04-11 PROBLEM — J10.1 INFLUENZA A: Status: RESOLVED | Noted: 2018-01-22 | Resolved: 2018-04-11

## 2022-05-13 NOTE — PROGRESS NOTES
None PROGRESS NOTE    Admit Date: 1/22/2018         Subjective: present with febrile illness cough chills and increase  In sob ,profound weakness and increase in confusion ,, seen in er influenza A positive  and bilateral infiltrates with acute renal insufficiency      Diet:  puree   Pain is:None  Nausea:None  Bowel Movement/Flatus no    Data:   Scheduled Meds: Reviewed  Continuous Infusions:   sodium chloride 1,000 mL (01/22/18 6177)     No intake or output data in the 24 hours ending 01/22/18 2130  Hematology:  Recent Labs      01/22/18   1704   WBC  4.8   HGB  13.0*   HCT  40.7*   PLT  152     Chemistry:  Recent Labs      01/22/18   1704   NA  137   K  4.3   CL  103   CO2  20   GLUCOSE  95   BUN  61*   CREATININE  2.33*   ANIONGAP  14   LABGLOM  27*   GFRAA  33*   CALCIUM  8.6   MYOGLOBIN  321*     Recent Labs      01/22/18   1704   PROT  6.1*   LABALBU  2.4*   AST  68*   ALT  6   ALKPHOS  73   BILITOT  0.19*   BILIDIR  <0.08       -----------------------------------------------------------------  RAD: bilateral infiltrates      Objective:   Vitals: BP (!) 127/47   Pulse 80   Temp 98.2 °F (36.8 °C) (Oral)   Resp 15   Ht 5' 2\" (1.575 m)   Wt 125 lb (56.7 kg)   SpO2 91%   BMI 22.86 kg/m²   General appearance: alert, appears stated age and cooperative  Skin: Skin color, texture, turgor normal.   HEENT: Head: Normocephalic, no lesions, without obvious abnormality. Neck / Thyroid: Supple, no masses, nodes, nodules or enlargement.   Neck: no adenopathy, no carotid bruit, no JVD, supple, symmetrical, trachea midline and thyroid not enlarged, symmetric, no tenderness/mass/nodules  Lungs: diminished breath sounds bibasilar, rales RLL and rhonchi anterior - bilateral, bilaterally and posterior - bilateral  Heart: regular rate and rhythm, S1, S2 normal, no murmur, click, rub or gallop  Abdomen: soft, non-tender; bowel sounds normal; no masses,  no organomegaly  Extremities: extremities normal, atraumatic, no cyanosis or

## (undated) DEVICE — 60 ML SYRINGE REGULAR TIP: Brand: MONOJECT

## (undated) DEVICE — BLOCK BITE 60FR RUBBER ADLT DENTAL

## (undated) DEVICE — MIC* SAFETY PERCUTANEOUS ENDOSCOPIC GASTROSTOMY PEG KIT - 20 FR - PUSH OTW: Brand: MIC PEG TUBE

## (undated) DEVICE — GLOVE SURG SZ 75 L12IN FNGR THK87MIL WHT LTX FREE

## (undated) DEVICE — GLOVE SURG SZ 7 L12IN FNGR THK87MIL WHT LTX FREE

## (undated) DEVICE — SOLUTION IV IRRIG 500ML 0.9% SODIUM CHL 2F7123

## (undated) DEVICE — GAUZE,SPONGE,4"X4",16PLY,STRL,LF,10/TRAY: Brand: MEDLINE